# Patient Record
Sex: FEMALE | Race: WHITE | NOT HISPANIC OR LATINO | Employment: FULL TIME | URBAN - METROPOLITAN AREA
[De-identification: names, ages, dates, MRNs, and addresses within clinical notes are randomized per-mention and may not be internally consistent; named-entity substitution may affect disease eponyms.]

---

## 2017-09-12 ENCOUNTER — APPOINTMENT (OUTPATIENT)
Dept: WOMENS IMAGING | Facility: HOSPITAL | Age: 47
End: 2017-09-12

## 2017-09-12 PROCEDURE — 77067 SCR MAMMO BI INCL CAD: CPT | Performed by: RADIOLOGY

## 2017-09-12 PROCEDURE — 77063 BREAST TOMOSYNTHESIS BI: CPT | Performed by: RADIOLOGY

## 2019-03-27 ENCOUNTER — APPOINTMENT (OUTPATIENT)
Dept: WOMENS IMAGING | Facility: HOSPITAL | Age: 49
End: 2019-03-27

## 2019-03-27 PROCEDURE — 77063 BREAST TOMOSYNTHESIS BI: CPT | Performed by: RADIOLOGY

## 2019-03-27 PROCEDURE — 77067 SCR MAMMO BI INCL CAD: CPT | Performed by: RADIOLOGY

## 2020-08-21 ENCOUNTER — APPOINTMENT (OUTPATIENT)
Dept: WOMENS IMAGING | Facility: HOSPITAL | Age: 50
End: 2020-08-21

## 2020-08-21 PROCEDURE — 77067 SCR MAMMO BI INCL CAD: CPT | Performed by: RADIOLOGY

## 2020-08-21 PROCEDURE — 77063 BREAST TOMOSYNTHESIS BI: CPT | Performed by: RADIOLOGY

## 2020-08-31 ENCOUNTER — OFFICE VISIT (OUTPATIENT)
Dept: CARDIOLOGY | Facility: CLINIC | Age: 50
End: 2020-08-31

## 2020-08-31 VITALS
SYSTOLIC BLOOD PRESSURE: 148 MMHG | DIASTOLIC BLOOD PRESSURE: 88 MMHG | BODY MASS INDEX: 26.63 KG/M2 | HEIGHT: 64 IN | WEIGHT: 156 LBS | HEART RATE: 74 BPM

## 2020-08-31 DIAGNOSIS — I10 ESSENTIAL HYPERTENSION: ICD-10-CM

## 2020-08-31 DIAGNOSIS — Z82.49 FAMILY HISTORY OF HYPERTROPHIC CARDIOMYOPATHY: Primary | ICD-10-CM

## 2020-08-31 PROCEDURE — 99204 OFFICE O/P NEW MOD 45 MIN: CPT | Performed by: INTERNAL MEDICINE

## 2020-08-31 PROCEDURE — 93000 ELECTROCARDIOGRAM COMPLETE: CPT | Performed by: INTERNAL MEDICINE

## 2020-08-31 RX ORDER — MULTIVITAMIN
TABLET ORAL
COMMUNITY

## 2020-08-31 RX ORDER — HYDROCHLOROTHIAZIDE 25 MG/1
25 TABLET ORAL DAILY
COMMUNITY
Start: 2020-07-28

## 2020-08-31 RX ORDER — CARVEDILOL 3.12 MG/1
3.12 TABLET ORAL 2 TIMES DAILY WITH MEALS
Qty: 90 TABLET | Refills: 3 | Status: SHIPPED | OUTPATIENT
Start: 2020-08-31 | End: 2021-06-01

## 2020-08-31 NOTE — PROGRESS NOTES
[facility] New Patient Office Note     Encounter Date:20  Patient:Yulissa Landa  :1970  MRN:[id number]    Referring Provider:  Wendy Rodriguez MD    Consulted for: Evaluation for hypertrophic cardiomyopathy    Chief Complaint:   Chief Complaint   Patient presents with   • Abnormal echo     History of Presenting Illness:      Ms. Landa is a 49 y.o. woman with past medical history notable for hypertension and family history of hypertrophic cardiomyopathy who presents to our office for initial evaluation regarding her family history.  Patient's mother who is approximately 72 years old was recently diagnosed with hypertrophic cardiomyopathy.  Also sounds like her aunts and uncles also have fairly strong family history of this with 1 of them requiring a defibrillator.  Given her family history her primary care physician appropriately ordered an echocardiogram to help further evaluate for any significant pathology.  Her echocardiogram did demonstrate some mild concentric hypertrophy however no other significant valvular abnormalities or structural normalities with the heart.    Given her family history her primary care physician did want her to seek out further evaluation with a cardiologist given her left ventricular hypertrophy seen on echocardiogram.  In general she has been doing quite well.  She does have some mild dyspnea on exertion when walking up heavy inclines but otherwise has no significant limitations in her daily activities.  She denies any significant chest discomfort or significant dyspnea exertion.  She denies any palpitations or racing heart rate.  She denies any presyncopal or syncopal episodes.      Review of Systems:  Review of Systems   Constitution: Negative.   HENT: Negative.    Eyes: Negative.    Cardiovascular: Positive for dyspnea on exertion.   Respiratory: Negative.    Endocrine: Negative.    Hematologic/Lymphatic: Negative.    Skin: Negative.    Musculoskeletal:  "Negative.    Gastrointestinal: Negative.    Genitourinary: Negative.    Neurological: Negative.    Psychiatric/Behavioral: Negative.    Allergic/Immunologic: Negative.        Current Outpatient Medications on File Prior to Visit   Medication Sig Dispense Refill   • hydroCHLOROthiazide (HYDRODIURIL) 25 MG tablet Take 25 mg by mouth Daily.     • Multiple Vitamin (ONE-A-DAY ESSENTIAL) tablet Take  by mouth.     • vitamin E 100 UNIT capsule Take 100 Units by mouth Daily.       No current facility-administered medications on file prior to visit.        No Known Allergies    Past Medical History:   Diagnosis Date   • HTN, goal below 140/90        Past Surgical History:   Procedure Laterality Date   •  SECTION     • HYSTERECTOMY         Social History     Socioeconomic History   • Marital status: Unknown     Spouse name: Not on file   • Number of children: Not on file   • Years of education: Not on file   • Highest education level: Not on file   Tobacco Use   • Smoking status: Never Smoker   • Smokeless tobacco: Never Used   • Tobacco comment: caffeine use    Substance and Sexual Activity   • Alcohol use: Yes     Comment: rare   • Drug use: Never       Family History   Problem Relation Age of Onset   • Hypertrophic cardiomyopathy Other    • Heart disease Mother    • Diabetes Father    • Cancer Maternal Grandmother    • Sudden death Paternal Grandmother    • Cancer Paternal Grandfather        The following portions of the patient's history were reviewed and updated as appropriate: allergies, current medications, past family history, past medical history, past social history, past surgical history and problem list.       Objective:       Vitals:    20 1352   BP: 148/88   BP Location: Right arm   Patient Position: Sitting   Pulse: 74   Weight: 70.8 kg (156 lb)   Height: 162.6 cm (64\")       Physical Exam:  Constitutional: Well appearing, well developed, no acute distress   HENT: Oropharynx clear and membrane " moist  Eyes: Normal conjunctiva, no sclera icterus.  Neck: Supple, no carotid bruit bilaterally.  Cardiovascular: Regular rate and rhythm, No Murmur, No bilateral lower extremity edema.  Pulmonary: Normal respiratory effort, normal lung sounds, no wheezing.  Abdominal: Soft, nontender, no hepatosplenomegaly, liver is non-pulsatile.  Neurological: Alert and orient x 3.   Skin: Warm, dry, no ecchymosis, no rash.  Psych: Appropriate mood and affect. Normal judgment and insight.      Lab Results   Component Value Date    GLUCOSE 103 (H) 12/20/2014    BUN 19 08/04/2020    CREATININE 0.8 08/04/2020    BCR 24.0 (H) 08/04/2020    K 3.5 08/04/2020    CO2 30 08/04/2020    CALCIUM 9.7 08/04/2020    ALBUMIN 4.6 08/04/2020    LABIL2 1.6 08/04/2020    AST 22 08/04/2020    ALT 27 08/04/2020       Lab Results   Component Value Date    WBC 13.12 (H) 12/20/2014    HGB 12.1 12/20/2014    HCT 37.8 12/20/2014    MCV 89.6 12/20/2014     12/20/2014       No results found for: CKTOTAL, CKMB, CKMBINDEX, TROPONINI, TROPONINT    Lab Results   Component Value Date    CHLPL 265 (H) 08/04/2020    CHLPL 245 (H) 09/04/2019    CHLPL 241 (H) 06/06/2018     Lab Results   Component Value Date    TRIG 221 (H) 08/04/2020    TRIG 223 (H) 09/04/2019    TRIG 156 (H) 06/06/2018     Lab Results   Component Value Date    HDL 59 08/04/2020    HDL 50 09/04/2019    HDL 57 06/06/2018     Lab Results   Component Value Date     (H) 08/04/2020     (H) 09/04/2019     (H) 06/06/2018       No results found for: TSH      ECG 12 Lead  Date/Time: 8/31/2020 2:27 PM  Performed by: Osman Eden MD  Authorized by: Osman Eden MD   Comparison: compared with previous ECG from 8/25/2015  Similar to previous ECG  Rhythm: sinus rhythm    Clinical impression: normal ECG        Echocardiogram [facility] 8/19/2020:  · The ejection fraction biplane was calculated at 61%. Mild concentric left ventricular hypertrophy.  · Mild aortic  regurgitation is noted.  · Trace mitral regurgitation is present.  · Mild tricuspid regurgitation.  · Right ventricular systolic pressure of 16 mmHg.  · Trace pulmonic insufficiency present.          Assessment:          Diagnosis Plan   1. Family history of hypertrophic cardiomyopathy     2. Essential hypertension            Plan:       Ms. Landa is a 49 y.o. woman with past medical history notable for hypertension and family history of hypertrophic cardiomyopathy who presents to our office for initial evaluation regarding her family history.  In general her echocardiogram results seem relatively benign.  She does have some concentric hypertrophy but she has no high risk features such as severely thickened interventricular septum or focal wall motion abnormalities or abnormalities on her EKG.  Clinically she has no high risk features such as syncope or immediate family members with sudden cardiac death.  I would recommend starting a low-dose carvedilol to help optimize her blood pressure management and will continue to monitor her symptoms.  I will see her back in 6 months to reassess symptoms after starting this medication.    Family history of hypertrophic cardiomyopathy:  · Patient without any high risk features on echocardiogram  · No high risk clinical features  · No further testing needed at this time but could consider cardiac MRI or Holter monitor if any new symptoms develop  · We will start low-dose carvedilol for blood pressure management    Hypertension:  · Continue hydrochlorothiazide with the addition of carvedilol to help with optimizing blood pressure control    Follow-up:  6 months    Thank you for allowing me to participate in the care of Yulissa Landa. Feel free to contact me directly with any further questions or concerns.    Osman Eden MD  [facility]  08/31/20  14:22

## 2021-06-01 RX ORDER — CARVEDILOL 3.12 MG/1
TABLET ORAL
Qty: 60 TABLET | Refills: 2 | Status: SHIPPED | OUTPATIENT
Start: 2021-06-01 | End: 2021-06-21 | Stop reason: SDUPTHER

## 2021-06-21 ENCOUNTER — OFFICE VISIT (OUTPATIENT)
Dept: CARDIOLOGY | Facility: CLINIC | Age: 51
End: 2021-06-21

## 2021-06-21 VITALS
HEART RATE: 59 BPM | WEIGHT: 156 LBS | BODY MASS INDEX: 26.63 KG/M2 | DIASTOLIC BLOOD PRESSURE: 80 MMHG | SYSTOLIC BLOOD PRESSURE: 136 MMHG | HEIGHT: 64 IN

## 2021-06-21 DIAGNOSIS — Z82.49 FAMILY HISTORY OF HYPERTROPHIC CARDIOMYOPATHY: Primary | ICD-10-CM

## 2021-06-21 DIAGNOSIS — I10 ESSENTIAL HYPERTENSION: ICD-10-CM

## 2021-06-21 PROCEDURE — 93000 ELECTROCARDIOGRAM COMPLETE: CPT | Performed by: INTERNAL MEDICINE

## 2021-06-21 PROCEDURE — 99214 OFFICE O/P EST MOD 30 MIN: CPT | Performed by: INTERNAL MEDICINE

## 2021-06-21 RX ORDER — CARVEDILOL 3.12 MG/1
3.12 TABLET ORAL 2 TIMES DAILY WITH MEALS
Qty: 180 TABLET | Refills: 3 | Status: SHIPPED | OUTPATIENT
Start: 2021-06-21 | End: 2021-08-23 | Stop reason: SDUPTHER

## 2021-06-21 NOTE — PROGRESS NOTES
[facility] Follow Up Patient Office Note     Encounter Date:21  Patient:Yulissa Landa  :1970  MRN:[id number]      Chief Complaint:   Chief Complaint   Patient presents with   • Hypertension     History of Presenting Illness:      Ms. Landa is a 50 y.o. woman with past medical history notable for hypertension and family history of hypertrophic cardiomyopathy who presents to our office for scheduled follow-up.  At her last visit we did start carvedilol to help with her blood pressure management as well as her hypertrophy.  Fortunately this is significantly proved her symptoms and also improved her blood pressure control.      Review of Systems:  Review of Systems   Constitutional: Negative.   HENT: Negative.    Eyes: Negative.    Cardiovascular: Negative.    Respiratory: Negative.    Endocrine: Negative.    Hematologic/Lymphatic: Negative.    Skin: Negative.    Musculoskeletal: Negative.    Gastrointestinal: Negative.    Genitourinary: Negative.    Neurological: Negative.    Psychiatric/Behavioral: Negative.    Allergic/Immunologic: Negative.        Current Outpatient Medications on File Prior to Visit   Medication Sig Dispense Refill   • hydroCHLOROthiazide (HYDRODIURIL) 25 MG tablet Take 25 mg by mouth Daily.     • Multiple Vitamin (ONE-A-DAY ESSENTIAL) tablet Take  by mouth.     • vitamin E 100 UNIT capsule Take 100 Units by mouth Daily.     • [DISCONTINUED] carvedilol (COREG) 3.125 MG tablet TAKE ONE TABLET BY MOUTH TWICE A DAY WITH MEALS 60 tablet 2     No current facility-administered medications on file prior to visit.       No Known Allergies    Past Medical History:   Diagnosis Date   • HTN, goal below 140/90        Past Surgical History:   Procedure Laterality Date   •  SECTION     • HYSTERECTOMY         Social History     Socioeconomic History   • Marital status: Unknown     Spouse name: Not on file   • Number of children: Not on file   • Years of education: Not on file   •  "Highest education level: Not on file   Tobacco Use   • Smoking status: Never Smoker   • Smokeless tobacco: Never Used   • Tobacco comment: caffeine use    Substance and Sexual Activity   • Alcohol use: Yes     Comment: rare   • Drug use: Never       Family History   Problem Relation Age of Onset   • Hypertrophic cardiomyopathy Other    • Heart disease Mother    • Diabetes Father    • Cancer Maternal Grandmother    • Sudden death Paternal Grandmother    • Cancer Paternal Grandfather        The following portions of the patient's history were reviewed and updated as appropriate: allergies, current medications, past family history, past medical history, past social history, past surgical history and problem list.       Objective:       Vitals:    06/21/21 1114   BP: 136/80   BP Location: Left arm   Patient Position: Sitting   Pulse: 59   Weight: 70.8 kg (156 lb)   Height: 162.6 cm (64\")     Body mass index is 26.78 kg/m².     Physical Exam:  Constitutional: Well appearing, well developed, no acute distress   HENT: Oropharynx clear and membrane moist  Eyes: Normal conjunctiva, no sclera icterus.  Neck: Supple, no carotid bruit bilaterally.  Cardiovascular: Regular rate and rhythm, No Murmur, No bilateral lower extremity edema.  Pulmonary: Normal respiratory effort, normal lung sounds, no wheezing.  Abdominal: Soft, nontender, no hepatosplenomegaly, liver is non-pulsatile.  Neurological: Alert and orient x 3.   Skin: Warm, dry, no ecchymosis, no rash.  Psych: Appropriate mood and affect. Normal judgment and insight.      Lab Results   Component Value Date    GLUCOSE 103 (H) 12/20/2014    BUN 19 08/04/2020    CREATININE 0.8 08/04/2020    BCR 24.0 (H) 08/04/2020    K 3.5 08/04/2020    CO2 30 08/04/2020    CALCIUM 9.7 08/04/2020    ALBUMIN 4.6 08/04/2020    LABIL2 1.6 08/04/2020    AST 22 08/04/2020    ALT 27 08/04/2020       Lab Results   Component Value Date    WBC 13.12 (H) 12/20/2014    HGB 12.1 12/20/2014    HCT 37.8 " 12/20/2014    MCV 89.6 12/20/2014     12/20/2014     No results found for: CKTOTAL, CKMB, CKMBINDEX, TROPONINI, TROPONINT    Lab Results   Component Value Date    CHLPL 265 (H) 08/04/2020    CHLPL 245 (H) 09/04/2019    CHLPL 241 (H) 06/06/2018     Lab Results   Component Value Date    TRIG 221 (H) 08/04/2020    TRIG 223 (H) 09/04/2019    TRIG 156 (H) 06/06/2018     Lab Results   Component Value Date    HDL 59 08/04/2020    HDL 50 09/04/2019    HDL 57 06/06/2018     Lab Results   Component Value Date     (H) 08/04/2020     (H) 09/04/2019     (H) 06/06/2018     No results found for: TSH      ECG 12 Lead    Date/Time: 6/21/2021 12:02 PM  Performed by: Osman Eden MD  Authorized by: Osman Eden MD   Comparison: compared with previous ECG from 8/31/2020  Similar to previous ECG  Rhythm: sinus rhythm    Clinical impression: normal ECG        Echocardiogram [facility] 8/19/2020:  · The ejection fraction biplane was calculated at 61%. Mild concentric left ventricular hypertrophy.  · Mild aortic regurgitation is noted.  · Trace mitral regurgitation is present.  · Mild tricuspid regurgitation.  · Right ventricular systolic pressure of 16 mmHg.  · Trace pulmonic insufficiency present.          Assessment:          Diagnosis Plan   1. Family history of hypertrophic cardiomyopathy  ECG 12 Lead   2. Essential hypertension            Plan:       Ms. Landa is a 50 y.o. woman with past medical history notable for hypertension and family history of hypertrophic cardiomyopathy who presents to our office for initial evaluation regarding her family history.  In general patient is doing quite well.  I will continue with her beta-blocker therapy.  No changes are needed at this time.  We will see her back in 6 months.    Family history of hypertrophic cardiomyopathy:  · Patient without any high risk features on echocardiogram 8/2020  · No high risk clinical features  · No further testing  needed at this time but could consider cardiac MRI or Holter monitor if any new symptoms develop  · Continue low-dose carvedilol    Hypertension:  · Continue hydrochlorothiazide with the addition of carvedilol to help with optimizing blood pressure control    Follow-up:  6 months    Thank you for allowing me to participate in the care of Yulissa Landa. Feel free to contact me directly with any further questions or concerns.    Osman Eden MD  [facility]  06/21/21  12:05 EDT

## 2021-08-23 RX ORDER — CARVEDILOL 3.12 MG/1
3.12 TABLET ORAL 2 TIMES DAILY WITH MEALS
Qty: 180 TABLET | Refills: 3 | Status: SHIPPED | OUTPATIENT
Start: 2021-08-23 | End: 2022-08-18 | Stop reason: SDUPTHER

## 2021-09-03 ENCOUNTER — APPOINTMENT (OUTPATIENT)
Dept: WOMENS IMAGING | Facility: HOSPITAL | Age: 51
End: 2021-09-03

## 2021-09-03 PROCEDURE — 77067 SCR MAMMO BI INCL CAD: CPT | Performed by: RADIOLOGY

## 2021-09-03 PROCEDURE — 77063 BREAST TOMOSYNTHESIS BI: CPT | Performed by: RADIOLOGY

## 2021-09-07 ENCOUNTER — TELEPHONE (OUTPATIENT)
Dept: CARDIOLOGY | Facility: CLINIC | Age: 51
End: 2021-09-07

## 2021-09-07 DIAGNOSIS — E78.5 HYPERLIPIDEMIA LDL GOAL <100: Primary | ICD-10-CM

## 2021-09-07 RX ORDER — ATORVASTATIN CALCIUM 10 MG/1
10 TABLET, FILM COATED ORAL DAILY
Qty: 30 TABLET | Refills: 3 | Status: SHIPPED | OUTPATIENT
Start: 2021-09-07 | End: 2021-12-08

## 2021-09-07 NOTE — TELEPHONE ENCOUNTER
----- Message from Osman Eden MD sent at 9/7/2021 11:28 AM EDT -----  Yes would be reasonable to start statin if she is interested.  We can follow up on repeat lipid panel results at next office visit in 5 months.  Should have 6 mo follow up  ----- Message -----  From: Vicki Martinez APRN  Sent: 9/7/2021  10:45 AM EDT  To: Osman Eden MD    You saw this lady for the first time last year due to a family history of hypertrophic cardiomyopathy and dyspnea on exertion.  She was started on carvedilol with improvement in her symptoms.  She has hypertension and no history of coronary disease.    I received some labs from her PCP in my inbox.  Her LDL was 189, HDL 54, triglycerides 156.  She is not on a statin.  Her PCP recommended she see her cardiologist for further recommendations.    Since I have never seen her, I thought it would be best for you to make the recommendation.  I would be happy to call her with the recommendation.

## 2021-09-07 NOTE — TELEPHONE ENCOUNTER
I spoke with her and recommended starting atorvastatin.  She is agreeable.  We will repeat lipid a lipid panel and hepatic function panel in 3 months.

## 2021-11-18 ENCOUNTER — PREP FOR SURGERY (OUTPATIENT)
Dept: SURGERY | Facility: SURGERY CENTER | Age: 51
End: 2021-11-18

## 2021-11-18 DIAGNOSIS — Z12.11 COLON CANCER SCREENING: Primary | ICD-10-CM

## 2021-11-22 PROBLEM — Z12.11 COLON CANCER SCREENING: Status: ACTIVE | Noted: 2021-11-22

## 2021-12-08 RX ORDER — PRAVASTATIN SODIUM 10 MG
10 TABLET ORAL DAILY
Qty: 90 TABLET | Refills: 0 | Status: SHIPPED | OUTPATIENT
Start: 2021-12-08 | End: 2022-01-12

## 2022-01-04 ENCOUNTER — LAB (OUTPATIENT)
Dept: LAB | Facility: HOSPITAL | Age: 52
End: 2022-01-04

## 2022-01-04 DIAGNOSIS — E78.5 HYPERLIPIDEMIA LDL GOAL <100: ICD-10-CM

## 2022-01-04 LAB
ALBUMIN SERPL-MCNC: 4.7 G/DL (ref 3.5–5.2)
ALP SERPL-CCNC: 86 U/L (ref 39–117)
ALT SERPL W P-5'-P-CCNC: 63 U/L (ref 1–33)
AST SERPL-CCNC: 30 U/L (ref 1–32)
BILIRUB CONJ SERPL-MCNC: <0.2 MG/DL (ref 0–0.3)
BILIRUB INDIRECT SERPL-MCNC: ABNORMAL MG/DL
BILIRUB SERPL-MCNC: 0.4 MG/DL (ref 0–1.2)
CHOLEST SERPL-MCNC: 257 MG/DL (ref 0–200)
HDLC SERPL-MCNC: 56 MG/DL (ref 40–60)
LDLC SERPL CALC-MCNC: 164 MG/DL (ref 0–100)
LDLC/HDLC SERPL: 2.86 {RATIO}
PROT SERPL-MCNC: 7.5 G/DL (ref 6–8.5)
TRIGL SERPL-MCNC: 203 MG/DL (ref 0–150)
VLDLC SERPL-MCNC: 37 MG/DL (ref 5–40)

## 2022-01-04 PROCEDURE — 80076 HEPATIC FUNCTION PANEL: CPT

## 2022-01-04 PROCEDURE — 80061 LIPID PANEL: CPT

## 2022-01-04 PROCEDURE — 36415 COLL VENOUS BLD VENIPUNCTURE: CPT

## 2022-01-12 ENCOUNTER — OFFICE VISIT (OUTPATIENT)
Dept: CARDIOLOGY | Facility: CLINIC | Age: 52
End: 2022-01-12

## 2022-01-12 VITALS
BODY MASS INDEX: 27.39 KG/M2 | HEART RATE: 73 BPM | SYSTOLIC BLOOD PRESSURE: 130 MMHG | WEIGHT: 160.4 LBS | HEIGHT: 64 IN | DIASTOLIC BLOOD PRESSURE: 80 MMHG

## 2022-01-12 DIAGNOSIS — Z82.49 FAMILY HISTORY OF HYPERTROPHIC CARDIOMYOPATHY: ICD-10-CM

## 2022-01-12 DIAGNOSIS — I10 ESSENTIAL HYPERTENSION: ICD-10-CM

## 2022-01-12 DIAGNOSIS — E78.5 HYPERLIPIDEMIA LDL GOAL <100: Primary | ICD-10-CM

## 2022-01-12 PROCEDURE — 99214 OFFICE O/P EST MOD 30 MIN: CPT | Performed by: INTERNAL MEDICINE

## 2022-01-12 PROCEDURE — 93000 ELECTROCARDIOGRAM COMPLETE: CPT | Performed by: INTERNAL MEDICINE

## 2022-01-12 RX ORDER — ROSUVASTATIN CALCIUM 5 MG/1
5 TABLET, COATED ORAL 3 TIMES WEEKLY
Qty: 90 TABLET | Refills: 0 | Status: SHIPPED | OUTPATIENT
Start: 2022-01-12 | End: 2022-07-27

## 2022-01-12 NOTE — PROGRESS NOTES
[facility] Follow Up Patient Office Note     Encounter Date:22  Patient:Yulissa Landa  :1970  MRN:[id number]      Chief Complaint:   Chief Complaint   Patient presents with   • Cardiomyopathy     6 month f/u   • Hypertension     History of Presenting Illness:      Ms. Landa is a 51 y.o. woman with past medical history notable for hypertension and family history of hypertrophic cardiomyopathy who presents to our office for scheduled follow-up.  She was last seen approximately 6 months ago and at that time was doing great after recently starting on carvedilol to help out with her blood pressure and palpitations.  In general she continues to do quite well.  She was noted to have increasing cholesterol numbers and does have a strong family history of hypercholesterolemia and was initially started on atorvastatin daily but unfortunately had issues with myalgias after about a month.  She was supposed to transition over to pravastatin but for some reason the prescription did not get called into the pharmacy or she was not able to pick it up from her pharmacy.  In general she has not been on anything since then and overall is doing well clinically but is wondering what to do about her cholesterol.      Review of Systems:  Review of Systems   Constitutional: Negative.   HENT: Negative.    Eyes: Negative.    Cardiovascular: Negative.    Respiratory: Negative.    Endocrine: Negative.    Hematologic/Lymphatic: Negative.    Skin: Negative.    Musculoskeletal: Negative.    Gastrointestinal: Negative.    Genitourinary: Negative.    Neurological: Negative.    Psychiatric/Behavioral: Negative.    Allergic/Immunologic: Negative.        Current Outpatient Medications on File Prior to Visit   Medication Sig Dispense Refill   • carvedilol (COREG) 3.125 MG tablet Take 1 tablet by mouth 2 (Two) Times a Day With Meals. 180 tablet 3   • hydroCHLOROthiazide (HYDRODIURIL) 25 MG tablet Take 25 mg by mouth Daily.     •  "Multiple Vitamin (ONE-A-DAY ESSENTIAL) tablet Take  by mouth.     • vitamin E 100 UNIT capsule Take 100 Units by mouth Daily.     • [DISCONTINUED] pravastatin (PRAVACHOL) 10 MG tablet Take 1 tablet by mouth Daily. 90 tablet 0     No current facility-administered medications on file prior to visit.       Allergies   Allergen Reactions   • Pravastatin Myalgia       Past Medical History:   Diagnosis Date   • HTN, goal below 140/90        Past Surgical History:   Procedure Laterality Date   •  SECTION     • HYSTERECTOMY         Social History     Socioeconomic History   • Marital status: Unknown   Tobacco Use   • Smoking status: Never Smoker   • Smokeless tobacco: Never Used   • Tobacco comment: caffeine use    Substance and Sexual Activity   • Alcohol use: Yes     Comment: rare   • Drug use: Never       Family History   Problem Relation Age of Onset   • Hypertrophic cardiomyopathy Other    • Heart disease Mother    • Diabetes Father    • Cancer Maternal Grandmother    • Sudden death Paternal Grandmother    • Cancer Paternal Grandfather        The following portions of the patient's history were reviewed and updated as appropriate: allergies, current medications, past family history, past medical history, past social history, past surgical history and problem list.       Objective:       Vitals:    22 1505   BP: 130/80   BP Location: Left arm   Patient Position: Sitting   Pulse: 73   Weight: 72.8 kg (160 lb 6.4 oz)   Height: 162.6 cm (64\")     Body mass index is 27.53 kg/m².     Physical Exam:  Constitutional: Well appearing, well developed, no acute distress   HENT: Oropharynx clear and membrane moist  Eyes: Normal conjunctiva, no sclera icterus.  Neck: Supple, no carotid bruit bilaterally.  Cardiovascular: Regular rate and rhythm, No Murmur, No bilateral lower extremity edema.  Pulmonary: Normal respiratory effort, normal lung sounds, no wheezing.  Abdominal: Soft, nontender, no hepatosplenomegaly, liver " is non-pulsatile.  Neurological: Alert and orient x 3.   Skin: Warm, dry, no ecchymosis, no rash.  Psych: Appropriate mood and affect. Normal judgment and insight.      Lab Results   Component Value Date    GLUCOSE 103 (H) 12/20/2014    BUN 23 (H) 09/01/2021    CREATININE 0.9 09/01/2021    BCR 25.0 (H) 09/01/2021    K 4.5 09/01/2021    CO2 30 09/01/2021    CALCIUM 9.9 09/01/2021    ALBUMIN 4.70 01/04/2022    LABIL2 1.5 09/01/2021    AST 30 01/04/2022    ALT 63 (H) 01/04/2022       Lab Results   Component Value Date    WBC 13.12 (H) 12/20/2014    HGB 12.1 12/20/2014    HCT 37.8 12/20/2014    MCV 89.6 12/20/2014     12/20/2014     No results found for: CKTOTAL, CKMB, CKMBINDEX, TROPONINI, TROPONINT    Lab Results   Component Value Date    CHOL 257 (H) 01/04/2022    CHLPL 265 (H) 08/04/2020    CHLPL 245 (H) 09/04/2019    CHLPL 241 (H) 06/06/2018     Lab Results   Component Value Date    TRIG 203 (H) 01/04/2022    TRIG 221 (H) 08/04/2020    TRIG 223 (H) 09/04/2019     Lab Results   Component Value Date    HDL 56 01/04/2022    HDL 59 08/04/2020    HDL 50 09/04/2019     Lab Results   Component Value Date     (H) 01/04/2022     (H) 08/04/2020     (H) 09/04/2019     The 10-year ASCVD risk score (Demotte PETE Jr., et al., 2013) is: 2.5%    Values used to calculate the score:      Age: 51 years      Sex: Female      Is Non- : No      Diabetic: No      Tobacco smoker: No      Systolic Blood Pressure: 130 mmHg      Is BP treated: Yes      HDL Cholesterol: 56 mg/dL      Total Cholesterol: 257 mg/dL     No results found for: TSH      ECG 12 Lead    Date/Time: 1/12/2022 3:41 PM  Performed by: Osman Eden MD  Authorized by: Osman Eden MD   Comparison: compared with previous ECG from 6/21/2021  Similar to previous ECG  Rhythm: sinus rhythm        Echocardiogram [facility] 8/19/2020:  · The ejection fraction biplane was calculated at 61%. Mild concentric left  ventricular hypertrophy.  · Mild aortic regurgitation is noted.  · Trace mitral regurgitation is present.  · Mild tricuspid regurgitation.  · Right ventricular systolic pressure of 16 mmHg.  · Trace pulmonic insufficiency present.          Assessment:          Diagnosis Plan   1. Hyperlipidemia LDL goal <100  Lipid Panel    Hepatic Function Panel   2. Family history of hypertrophic cardiomyopathy  ECG 12 Lead   3. Essential hypertension            Plan:       Ms. Landa is a 51 y.o. woman with past medical history notable for hypertension and family history of hypertrophic cardiomyopathy who presents to our office for scheduled follow-up.  Overall she is doing well from a blood pressure and symptom standpoint.  With regards to her hypercholesterolemia her numbers are elevated but overall 10-year risk score is still low at 2.5%.  This does not necessitate the need for primary prevention statin therapy but given her family history and likely continued increase in her LDL she will need statin therapy at some point and would be reasonable to try an alternative statin dosing regimen at this time.  Had issues taking atorvastatin daily and she might be a slow metabolizer with this reason I am going to do alternative dosing with rosuvastatin 5 mg 3 times a week as this usually will allow for a nice washout.  And most people can tolerate this regimen.  We will follow-up on her cholesterol results and see if any further titration is needed and can always increase the strength of our rosuvastatin dosing but would keep it at 3 times a week dosing regimen..    Family history of hypertrophic cardiomyopathy:  · Patient without any high risk features on echocardiogram 8/2020  · No high risk clinical features  · No further testing needed at this time but could consider cardiac MRI or Holter monitor if any new symptoms develop  · Continue low-dose carvedilol    Mixed hyperlipidemia:  · Overall from a primary prevention standpoint low  risk but given strong family history would not be unreasonable to try different statin regimen  · We will start rosuvastatin 5 mg Monday/Wednesday/Friday  · We will follow-up with repeat lipid panel and CMP in about a month    Hypertension:  · Continue hydrochlorothiazide with the addition of carvedilol to help with optimizing blood pressure control    Follow-up:  6 months    Thank you for allowing me to participate in the care of Yulissa Landa. Feel free to contact me directly with any further questions or concerns.    Osman Eden MD  [facility]  01/12/22  15:41 EST

## 2022-02-25 ENCOUNTER — ANESTHESIA (OUTPATIENT)
Dept: SURGERY | Facility: SURGERY CENTER | Age: 52
End: 2022-02-25

## 2022-02-25 ENCOUNTER — ANESTHESIA EVENT (OUTPATIENT)
Dept: SURGERY | Facility: SURGERY CENTER | Age: 52
End: 2022-02-25

## 2022-02-25 ENCOUNTER — HOSPITAL ENCOUNTER (OUTPATIENT)
Facility: SURGERY CENTER | Age: 52
Setting detail: HOSPITAL OUTPATIENT SURGERY
Discharge: HOME | End: 2022-02-25
Attending: SURGERY | Admitting: SURGERY

## 2022-02-25 VITALS
DIASTOLIC BLOOD PRESSURE: 85 MMHG | TEMPERATURE: 97.5 F | RESPIRATION RATE: 16 BRPM | HEART RATE: 73 BPM | BODY MASS INDEX: 27.04 KG/M2 | WEIGHT: 158.4 LBS | OXYGEN SATURATION: 98 % | SYSTOLIC BLOOD PRESSURE: 151 MMHG | HEIGHT: 64 IN

## 2022-02-25 PROCEDURE — S0260 H&P FOR SURGERY: HCPCS | Performed by: SURGERY

## 2022-02-25 PROCEDURE — 25010000002 PROPOFOL 10 MG/ML EMULSION: Performed by: ANESTHESIOLOGY

## 2022-02-25 PROCEDURE — 45378 DIAGNOSTIC COLONOSCOPY: CPT | Performed by: SURGERY

## 2022-02-25 RX ORDER — LIDOCAINE HYDROCHLORIDE 10 MG/ML
0.5 INJECTION, SOLUTION INFILTRATION; PERINEURAL ONCE AS NEEDED
Status: DISCONTINUED | OUTPATIENT
Start: 2022-02-25 | End: 2022-02-25 | Stop reason: HOSPADM

## 2022-02-25 RX ORDER — ONDANSETRON 2 MG/ML
4 INJECTION INTRAMUSCULAR; INTRAVENOUS ONCE AS NEEDED
Status: DISCONTINUED | OUTPATIENT
Start: 2022-02-25 | End: 2022-02-25 | Stop reason: HOSPADM

## 2022-02-25 RX ORDER — PROPOFOL 10 MG/ML
VIAL (ML) INTRAVENOUS AS NEEDED
Status: DISCONTINUED | OUTPATIENT
Start: 2022-02-25 | End: 2022-02-25 | Stop reason: SURG

## 2022-02-25 RX ORDER — MAGNESIUM HYDROXIDE 1200 MG/15ML
LIQUID ORAL AS NEEDED
Status: DISCONTINUED | OUTPATIENT
Start: 2022-02-25 | End: 2022-02-25 | Stop reason: HOSPADM

## 2022-02-25 RX ORDER — LABETALOL HYDROCHLORIDE 5 MG/ML
5 INJECTION, SOLUTION INTRAVENOUS
Status: DISCONTINUED | OUTPATIENT
Start: 2022-02-25 | End: 2022-02-25 | Stop reason: HOSPADM

## 2022-02-25 RX ORDER — LIDOCAINE HYDROCHLORIDE 20 MG/ML
INJECTION, SOLUTION INFILTRATION; PERINEURAL AS NEEDED
Status: DISCONTINUED | OUTPATIENT
Start: 2022-02-25 | End: 2022-02-25 | Stop reason: SURG

## 2022-02-25 RX ORDER — SODIUM CHLORIDE 0.9 % (FLUSH) 0.9 %
10 SYRINGE (ML) INJECTION AS NEEDED
Status: DISCONTINUED | OUTPATIENT
Start: 2022-02-25 | End: 2022-02-25 | Stop reason: HOSPADM

## 2022-02-25 RX ORDER — FENTANYL CITRATE 50 UG/ML
25 INJECTION, SOLUTION INTRAMUSCULAR; INTRAVENOUS
Status: DISCONTINUED | OUTPATIENT
Start: 2022-02-25 | End: 2022-02-25 | Stop reason: HOSPADM

## 2022-02-25 RX ORDER — SODIUM CHLORIDE, SODIUM LACTATE, POTASSIUM CHLORIDE, CALCIUM CHLORIDE 600; 310; 30; 20 MG/100ML; MG/100ML; MG/100ML; MG/100ML
1000 INJECTION, SOLUTION INTRAVENOUS CONTINUOUS
Status: DISCONTINUED | OUTPATIENT
Start: 2022-02-25 | End: 2022-02-25 | Stop reason: HOSPADM

## 2022-02-25 RX ORDER — HYDRALAZINE HYDROCHLORIDE 20 MG/ML
10 INJECTION INTRAMUSCULAR; INTRAVENOUS
Status: DISCONTINUED | OUTPATIENT
Start: 2022-02-25 | End: 2022-02-25 | Stop reason: HOSPADM

## 2022-02-25 RX ADMIN — SODIUM CHLORIDE, POTASSIUM CHLORIDE, SODIUM LACTATE AND CALCIUM CHLORIDE 1000 ML: 600; 310; 30; 20 INJECTION, SOLUTION INTRAVENOUS at 08:18

## 2022-02-25 RX ADMIN — PROPOFOL 120 MG: 10 INJECTION, EMULSION INTRAVENOUS at 09:25

## 2022-02-25 RX ADMIN — PROPOFOL 160 MCG/KG/MIN: 10 INJECTION, EMULSION INTRAVENOUS at 09:25

## 2022-02-25 RX ADMIN — LIDOCAINE HYDROCHLORIDE 60 MG: 20 INJECTION, SOLUTION INFILTRATION; PERINEURAL at 09:25

## 2022-02-25 NOTE — ANESTHESIA POSTPROCEDURE EVALUATION
"Patient: Yulissa Landa    Procedure Summary     Date: 02/25/22 Room / Location: [facility]    Anesthesia Start: 0922 Anesthesia Stop: 0951    Procedure: COLONOSCOPY (N/A ) Diagnosis:       Colon cancer screening      (Colon cancer screening [Z12.11])    Surgeons: Eva Altman MD Provider: Yeyo Chung MD    Anesthesia Type: MAC ASA Status: 2          Anesthesia Type: MAC    Vitals  Vitals Value Taken Time   /73 02/25/22 0952   Temp 36.4 °C (97.5 °F) 02/25/22 0952   Pulse 74 02/25/22 0954   Resp 16 02/25/22 0952   SpO2 100 % 02/25/22 0954   Vitals shown include unvalidated device data.        Post Anesthesia Care and Evaluation    Patient location during evaluation: bedside  Patient participation: complete - patient participated  Level of consciousness: sleepy but conscious  Pain management: adequate  Airway patency: patent  Anesthetic complications: No anesthetic complications    Cardiovascular status: acceptable  Respiratory status: acceptable  Hydration status: acceptable    Comments: */73 (BP Location: Left arm, Patient Position: Lying)   Pulse 80   Temp 36.4 °C (97.5 °F) (Infrared)   Resp 16   Ht 162.6 cm (64\")   Wt 71.8 kg (158 lb 6.4 oz)   SpO2 99%   BMI 27.19 kg/m²         "

## 2022-02-25 NOTE — ANESTHESIA PREPROCEDURE EVALUATION
Anesthesia Evaluation     no history of anesthetic complications:  NPO Solid Status: > 8 hours  NPO Liquid Status: > 2 hours           Airway   Mallampati: III  TM distance: <3 FB  Neck ROM: full  Possible difficult intubation  Dental - normal exam     Pulmonary - negative pulmonary ROS and normal exam   (-) COPD, asthma, sleep apnea, not a smoker    PE comment: nonlabored  Cardiovascular - normal exam    Rhythm: regular  Rate: normal    (+) hypertension, hyperlipidemia,   (-) valvular problems/murmurs, past MI, CAD, dysrhythmias, angina    ROS comment: On Beta-blocker for family hx of HOCM in mother--EKG's normal thus far per pt    Neuro/Psych- negative ROS  (-) seizures, TIA, CVA  GI/Hepatic/Renal/Endo - negative ROS   (-) GERD, liver disease, no renal disease, diabetes, no thyroid disorder    Musculoskeletal (-) negative ROS    Abdominal    Substance History      OB/GYN          Other                      Anesthesia Plan    ASA 2     MAC       Anesthetic plan, all risks, benefits, and alternatives have been provided, discussed and informed consent has been obtained with: patient.        CODE STATUS:

## 2022-03-03 ENCOUNTER — TELEPHONE (OUTPATIENT)
Dept: SURGERY | Facility: CLINIC | Age: 52
End: 2022-03-03

## 2022-03-03 NOTE — TELEPHONE ENCOUNTER
Called pt and let her know that there were no findings in her colonoscopy and our office will send the recall in 5 years. Pt understood   .

## 2022-03-09 ENCOUNTER — TELEPHONE (OUTPATIENT)
Dept: CARDIOLOGY | Facility: CLINIC | Age: 52
End: 2022-03-09

## 2022-07-27 RX ORDER — ROSUVASTATIN CALCIUM 5 MG/1
TABLET, COATED ORAL
Qty: 36 TABLET | Refills: 2 | Status: SHIPPED | OUTPATIENT
Start: 2022-07-27 | End: 2023-02-20 | Stop reason: SDUPTHER

## 2022-08-10 ENCOUNTER — LAB (OUTPATIENT)
Dept: LAB | Facility: HOSPITAL | Age: 52
End: 2022-08-10

## 2022-08-10 DIAGNOSIS — E78.5 HYPERLIPIDEMIA LDL GOAL <100: ICD-10-CM

## 2022-08-10 LAB
ALBUMIN SERPL-MCNC: 4.5 G/DL (ref 3.5–5.2)
ALP SERPL-CCNC: 80 U/L (ref 39–117)
ALT SERPL W P-5'-P-CCNC: 43 U/L (ref 1–33)
AST SERPL-CCNC: 29 U/L (ref 1–32)
BILIRUB CONJ SERPL-MCNC: <0.2 MG/DL (ref 0–0.3)
BILIRUB INDIRECT SERPL-MCNC: ABNORMAL MG/DL
BILIRUB SERPL-MCNC: 0.5 MG/DL (ref 0–1.2)
PROT SERPL-MCNC: 7.2 G/DL (ref 6–8.5)

## 2022-08-10 PROCEDURE — 80076 HEPATIC FUNCTION PANEL: CPT

## 2022-08-10 PROCEDURE — 36415 COLL VENOUS BLD VENIPUNCTURE: CPT

## 2022-08-18 ENCOUNTER — OFFICE VISIT (OUTPATIENT)
Dept: CARDIOLOGY | Facility: CLINIC | Age: 52
End: 2022-08-18

## 2022-08-18 VITALS
DIASTOLIC BLOOD PRESSURE: 82 MMHG | WEIGHT: 169 LBS | HEART RATE: 79 BPM | BODY MASS INDEX: 28.85 KG/M2 | HEIGHT: 64 IN | SYSTOLIC BLOOD PRESSURE: 140 MMHG

## 2022-08-18 DIAGNOSIS — Z82.49 FAMILY HISTORY OF HYPERTROPHIC CARDIOMYOPATHY: Primary | ICD-10-CM

## 2022-08-18 DIAGNOSIS — I10 ESSENTIAL HYPERTENSION: ICD-10-CM

## 2022-08-18 DIAGNOSIS — E78.5 HYPERLIPIDEMIA LDL GOAL <100: ICD-10-CM

## 2022-08-18 PROCEDURE — 93000 ELECTROCARDIOGRAM COMPLETE: CPT | Performed by: INTERNAL MEDICINE

## 2022-08-18 PROCEDURE — 99214 OFFICE O/P EST MOD 30 MIN: CPT | Performed by: INTERNAL MEDICINE

## 2022-08-18 RX ORDER — CARVEDILOL 6.25 MG/1
6.25 TABLET ORAL 2 TIMES DAILY WITH MEALS
Qty: 180 TABLET | Refills: 3 | Status: SHIPPED | OUTPATIENT
Start: 2022-08-18 | End: 2023-02-20 | Stop reason: ALTCHOICE

## 2022-08-18 NOTE — PROGRESS NOTES
[facility] Follow Up Patient Office Note     Encounter Date:22  Patient:Yulissa Landa  :1970  MRN:[id number]      Chief Complaint:   Chief Complaint   Patient presents with   • Hyperlipidemia     6 month f/u   • Hypertension     History of Presenting Illness:      Ms. Landa is a 51 y.o. woman with past medical history notable for hypertension and family history of hypertrophic cardiomyopathy who presents to our office for scheduled follow-up.  overall she is doing well.  Blood pressure still mildly but overall is feeling much better.  Her cholesterol unfortunately was not checked.  They did check her until had a panel implies that did not put all the lipid panel as well but if she is getting follow-up labs with her primary care physician soon.      Review of Systems:  Review of Systems   Constitutional: Negative.   HENT: Negative.    Eyes: Negative.    Cardiovascular: Negative.    Respiratory: Negative.    Endocrine: Negative.    Hematologic/Lymphatic: Negative.    Skin: Negative.    Musculoskeletal: Negative.    Gastrointestinal: Negative.    Genitourinary: Negative.    Neurological: Negative.    Psychiatric/Behavioral: Negative.    Allergic/Immunologic: Negative.        Current Outpatient Medications on File Prior to Visit   Medication Sig Dispense Refill   • hydroCHLOROthiazide (HYDRODIURIL) 25 MG tablet Take 25 mg by mouth Daily.     • Multiple Vitamin (ONE-A-DAY ESSENTIAL) tablet Take  by mouth.     • rosuvastatin (CRESTOR) 5 MG tablet TAKE ONE TABLET BY MOUTH THREE TIMES A WEEK 36 tablet 2   • vitamin E 100 UNIT capsule Take 100 Units by mouth Daily.     • [DISCONTINUED] carvedilol (COREG) 3.125 MG tablet Take 1 tablet by mouth 2 (Two) Times a Day With Meals. 180 tablet 3     No current facility-administered medications on file prior to visit.       Allergies   Allergen Reactions   • Pravastatin Myalgia       Past Medical History:   Diagnosis Date   • HTN, goal below 140/90    •  "Hyperlipidemia        Past Surgical History:   Procedure Laterality Date   •  SECTION     • COLONOSCOPY N/A 2022    Procedure: COLONOSCOPY;  Surgeon: Eva Altman MD;  [place] [facility] [place]  Service: Gastroenterology;  Laterality: N/A;  normal   • HYSTERECTOMY     • LAPAROSCOPIC SALPINGOOPHERECTOMY Bilateral        Social History     Socioeconomic History   • Marital status: Unknown   Tobacco Use   • Smoking status: Never Smoker   • Smokeless tobacco: Never Used   • Tobacco comment: caffeine use    Vaping Use   • Vaping Use: Never used   Substance and Sexual Activity   • Alcohol use: Yes     Comment: rare   • Drug use: Never   • Sexual activity: Yes       Family History   Problem Relation Age of Onset   • Hypertrophic cardiomyopathy Other    • Heart disease Mother    • Diabetes Father    • Cancer Maternal Grandmother    • Sudden death Paternal Grandmother    • Cancer Paternal Grandfather    • Malig Hyperthermia Neg Hx        The following portions of the patient's history were reviewed and updated as appropriate: allergies, current medications, past family history, past medical history, past social history, past surgical history and problem list.       Objective:       Vitals:    22 1356   BP: 140/82   BP Location: Left arm   Patient Position: Sitting   Pulse: 79   Weight: 76.7 kg (169 lb)   Height: 162.6 cm (64\")     Body mass index is 29.01 kg/m².     Physical Exam:  Constitutional: Well appearing, well developed, no acute distress   HENT: Oropharynx clear and membrane moist  Eyes: Normal conjunctiva, no sclera icterus.  Neck: Supple, no carotid bruit bilaterally.  Cardiovascular: Regular rate and rhythm, No Murmur, No bilateral lower extremity edema.  Pulmonary: Normal respiratory effort, normal lung sounds, no wheezing.  Abdominal: Soft, nontender, no hepatosplenomegaly, liver is non-pulsatile.  Neurological: Alert and orient x 3.   Skin: Warm, dry, no ecchymosis, no rash.  Psych: " Appropriate mood and affect. Normal judgment and insight.      Lab Results   Component Value Date    GLUCOSE 103 (H) 12/20/2014    BUN 23 (H) 09/01/2021    CREATININE 0.9 09/01/2021    BCR 25.0 (H) 09/01/2021    K 4.5 09/01/2021    CO2 30 09/01/2021    CALCIUM 9.9 09/01/2021    ALBUMIN 4.50 08/10/2022    LABIL2 1.5 09/01/2021    AST 29 08/10/2022    ALT 43 (H) 08/10/2022       Lab Results   Component Value Date    WBC 13.12 (H) 12/20/2014    HGB 12.1 12/20/2014    HCT 37.8 12/20/2014    MCV 89.6 12/20/2014     12/20/2014     No results found for: CKTOTAL, CKMB, CKMBINDEX, TROPONINI, TROPONINT    Lab Results   Component Value Date    CHOL 257 (H) 01/04/2022    CHLPL 265 (H) 08/04/2020    CHLPL 245 (H) 09/04/2019    CHLPL 241 (H) 06/06/2018     Lab Results   Component Value Date    TRIG 203 (H) 01/04/2022    TRIG 221 (H) 08/04/2020    TRIG 223 (H) 09/04/2019     Lab Results   Component Value Date    HDL 56 01/04/2022    HDL 59 08/04/2020    HDL 50 09/04/2019     Lab Results   Component Value Date     (H) 01/04/2022     (H) 08/04/2020     (H) 09/04/2019     The 10-year ASCVD risk score (Weiner DC Jr., et al., 2013) is: 3%    Values used to calculate the score:      Age: 51 years      Sex: Female      Is Non- : No      Diabetic: No      Tobacco smoker: No      Systolic Blood Pressure: 140 mmHg      Is BP treated: Yes      HDL Cholesterol: 56 mg/dL      Total Cholesterol: 257 mg/dL     No results found for: TSH      ECG 12 Lead    Date/Time: 8/18/2022 2:22 PM  Performed by: Osman Eden MD  Authorized by: Osman Eden MD   Comparison: compared with previous ECG from 1/12/2022  Similar to previous ECG  Rhythm: sinus rhythm        Echocardiogram [facility] 8/19/2020:  · The ejection fraction biplane was calculated at 61%. Mild concentric left ventricular hypertrophy.  · Mild aortic regurgitation is noted.  · Trace mitral regurgitation is present.  · Mild  tricuspid regurgitation.  · Right ventricular systolic pressure of 16 mmHg.  · Trace pulmonic insufficiency present.          Assessment:          Diagnosis Plan   1. Family history of hypertrophic cardiomyopathy  ECG 12 Lead   2. Essential hypertension     3. Hyperlipidemia LDL goal <100            Plan:       Ms. Landa is a 51 y.o. woman with past medical history notable for hypertension and family history of hypertrophic cardiomyopathy who presents to our office for scheduled follow-up.  Overall she is doing well but I would like to get better blood pressure control and will increase her carvedilol.  She is getting follow-up labs with her primary care physician we will follow-up on her cholesterol levels.  Her ALT is slightly elevated but actually better than before and would continue to monitor for the time being..    Family history of hypertrophic cardiomyopathy:  · Patient without any high risk features on echocardiogram 8/2020  · No high risk clinical features  · No further testing needed at this time but could consider cardiac MRI or Holter monitor if any new symptoms develop  · Continue carvedilol    Mixed hyperlipidemia:  · Overall from a primary prevention standpoint low risk but given strong family history would not be unreasonable to try different statin regimen  · We will continue rosuvastatin 5 mg Monday/Wednesday/Friday  · We will follow-up with repeat lipid panel     Hypertension:  · Continue we will uptitrate carvedilol and monitor response    Follow-up:  6 months    Thank you for allowing me to participate in the care of Yulissa Landa. Feel free to contact me directly with any further questions or concerns.    Osman Eden MD  [facility]  08/18/22  15:10 EDT

## 2022-10-17 ENCOUNTER — APPOINTMENT (OUTPATIENT)
Dept: WOMENS IMAGING | Facility: HOSPITAL | Age: 52
End: 2022-10-17

## 2022-10-17 PROCEDURE — 77067 SCR MAMMO BI INCL CAD: CPT | Performed by: RADIOLOGY

## 2022-10-17 PROCEDURE — 77063 BREAST TOMOSYNTHESIS BI: CPT | Performed by: RADIOLOGY

## 2022-12-19 RX ORDER — CARVEDILOL 3.12 MG/1
TABLET ORAL
Qty: 180 TABLET | Refills: 3 | Status: SHIPPED | OUTPATIENT
Start: 2022-12-19 | End: 2023-02-20

## 2023-02-20 ENCOUNTER — OFFICE VISIT (OUTPATIENT)
Dept: CARDIOLOGY | Facility: CLINIC | Age: 53
End: 2023-02-20
Payer: COMMERCIAL

## 2023-02-20 VITALS
BODY MASS INDEX: 28.03 KG/M2 | HEART RATE: 69 BPM | DIASTOLIC BLOOD PRESSURE: 90 MMHG | WEIGHT: 164.2 LBS | HEIGHT: 64 IN | SYSTOLIC BLOOD PRESSURE: 148 MMHG

## 2023-02-20 DIAGNOSIS — Z82.49 FAMILY HISTORY OF HYPERTROPHIC CARDIOMYOPATHY: ICD-10-CM

## 2023-02-20 DIAGNOSIS — I10 ESSENTIAL HYPERTENSION: Primary | ICD-10-CM

## 2023-02-20 PROCEDURE — 93000 ELECTROCARDIOGRAM COMPLETE: CPT | Performed by: INTERNAL MEDICINE

## 2023-02-20 PROCEDURE — 99214 OFFICE O/P EST MOD 30 MIN: CPT | Performed by: INTERNAL MEDICINE

## 2023-02-20 RX ORDER — CARVEDILOL 6.25 MG/1
6.25 TABLET ORAL 2 TIMES DAILY WITH MEALS
Qty: 180 TABLET | Refills: 3 | Status: SHIPPED | OUTPATIENT
Start: 2023-02-20

## 2023-02-20 RX ORDER — ROSUVASTATIN CALCIUM 5 MG/1
5 TABLET, COATED ORAL 3 TIMES WEEKLY
Qty: 36 TABLET | Refills: 2 | Status: SHIPPED | OUTPATIENT
Start: 2023-02-20

## 2023-02-20 RX ORDER — LEVOTHYROXINE SODIUM 0.03 MG/1
25 TABLET ORAL DAILY
COMMUNITY
Start: 2023-02-17 | End: 2023-08-16

## 2023-02-20 NOTE — PROGRESS NOTES
[facility] Follow Up Patient Office Note     Encounter Date:23  Patient:Yulissa Landa  :1970  MRN:[id number]      Chief Complaint:   Chief Complaint   Patient presents with   • Hypertension     6 month f/u   • Hyperlipidemia     History of Presenting Illness:      Ms. Landa is a 52 y.o. woman with past medical history notable for hypertension and family history of hypertrophic cardiomyopathy who presents to our office for scheduled follow-up.  Overall patient is doing well since her last appointment.  She has been out of her blood pressure medicines for the last couple of days is getting her hydrochlorothiazide filled today from her primary.  Outside of that she is doing well.  She was found to have a low thyroid by her primary and is also starting on Synthroid.      Review of Systems:  Review of Systems   Constitutional: Negative.   HENT: Negative.    Eyes: Negative.    Cardiovascular: Negative.    Respiratory: Negative.    Endocrine: Negative.    Hematologic/Lymphatic: Negative.    Skin: Negative.    Musculoskeletal: Negative.    Gastrointestinal: Negative.    Genitourinary: Negative.    Neurological: Negative.    Psychiatric/Behavioral: Negative.    Allergic/Immunologic: Negative.        Current Outpatient Medications on File Prior to Visit   Medication Sig Dispense Refill   • hydroCHLOROthiazide (HYDRODIURIL) 25 MG tablet Take 25 mg by mouth Daily.     • levothyroxine (SYNTHROID, LEVOTHROID) 25 MCG tablet Take 25 mcg by mouth Daily.     • Multiple Vitamin (ONE-A-DAY ESSENTIAL) tablet Take  by mouth.     • vitamin E 100 UNIT capsule Take 100 Units by mouth Daily.     • [DISCONTINUED] carvedilol (COREG) 3.125 MG tablet TAKE ONE TABLET BY MOUTH TWICE A DAY WITH A MEAL 180 tablet 3   • [DISCONTINUED] rosuvastatin (CRESTOR) 5 MG tablet TAKE ONE TABLET BY MOUTH THREE TIMES A WEEK 36 tablet 2   • [DISCONTINUED] carvedilol (COREG) 6.25 MG tablet Take 1 tablet by mouth 2 (Two) Times a Day With  "Meals. 180 tablet 3     No current facility-administered medications on file prior to visit.       Allergies   Allergen Reactions   • Pravastatin Myalgia       Past Medical History:   Diagnosis Date   • HTN, goal below 140/90    • Hyperlipidemia        Past Surgical History:   Procedure Laterality Date   •  SECTION     • COLONOSCOPY N/A 2022    Procedure: COLONOSCOPY;  Surgeon: Eva Altman MD;  [facility];  Service: Gastroenterology;  Laterality: N/A;  normal   • HYSTERECTOMY     • LAPAROSCOPIC SALPINGOOPHERECTOMY Bilateral        Social History     Socioeconomic History   • Marital status: Unknown   Tobacco Use   • Smoking status: Never   • Smokeless tobacco: Never   • Tobacco comments:     caffeine use    Vaping Use   • Vaping Use: Never used   Substance and Sexual Activity   • Alcohol use: Yes     Comment: rare   • Drug use: Never   • Sexual activity: Yes       Family History   Problem Relation Age of Onset   • Hypertrophic cardiomyopathy Other    • Heart disease Mother    • Diabetes Father    • Cancer Maternal Grandmother    • Sudden death Paternal Grandmother    • Cancer Paternal Grandfather    • Malig Hyperthermia Neg Hx        The following portions of the patient's history were reviewed and updated as appropriate: allergies, current medications, past family history, past medical history, past social history, past surgical history and problem list.       Objective:       Vitals:    23 1227   BP: 148/90   BP Location: Left arm   Patient Position: Sitting   Pulse: 69   Weight: 74.5 kg (164 lb 3.2 oz)   Height: 162.6 cm (64\")     Body mass index is 28.18 kg/m².     Physical Exam:  Constitutional: Well appearing, well developed, no acute distress   HENT: Oropharynx clear and membrane moist  Eyes: Normal conjunctiva, no sclera icterus.  Neck: Supple, no carotid bruit bilaterally.  Cardiovascular: Regular rate and rhythm, No Murmur, No bilateral lower extremity " edema.  Pulmonary: Normal respiratory effort, normal lung sounds, no wheezing.  Abdominal: Soft, nontender, no hepatosplenomegaly, liver is non-pulsatile.  Neurological: Alert and orient x 3.   Skin: Warm, dry, no ecchymosis, no rash.  Psych: Appropriate mood and affect. Normal judgment and insight.      Lab Results   Component Value Date    GLUCOSE 103 (H) 12/20/2014    BUN 23 (H) 09/01/2021    CREATININE 0.9 09/01/2021    BCR 25.0 (H) 09/01/2021    K 4.5 09/01/2021    CO2 30 09/01/2021    CALCIUM 9.9 09/01/2021    ALBUMIN 4.50 08/10/2022    LABIL2 1.5 09/01/2021    AST 29 08/10/2022    ALT 43 (H) 08/10/2022       Lab Results   Component Value Date    WBC 13.12 (H) 12/20/2014    HGB 12.1 12/20/2014    HCT 37.8 12/20/2014    MCV 89.6 12/20/2014     12/20/2014     No results found for: CKTOTAL, CKMB, CKMBINDEX, TROPONINI, TROPONINT    Lab Results   Component Value Date    CHOL 257 (H) 01/04/2022    CHLPL 265 (H) 08/04/2020    CHLPL 245 (H) 09/04/2019    CHLPL 241 (H) 06/06/2018     Lab Results   Component Value Date    TRIG 203 (H) 01/04/2022    TRIG 221 (H) 08/04/2020    TRIG 223 (H) 09/04/2019     Lab Results   Component Value Date    HDL 56 01/04/2022    HDL 59 08/04/2020    HDL 50 09/04/2019     Lab Results   Component Value Date     (H) 01/04/2022     (H) 08/04/2020     (H) 09/04/2019     The 10-year ASCVD risk score (Greta DK, et al., 2019) is: 3.6%    Values used to calculate the score:      Age: 52 years      Sex: Female      Is Non- : No      Diabetic: No      Tobacco smoker: No      Systolic Blood Pressure: 148 mmHg      Is BP treated: Yes      HDL Cholesterol: 56 mg/dL      Total Cholesterol: 257 mg/dL     No results found for: TSH      ECG 12 Lead    Date/Time: 2/20/2023 1:04 PM  Performed by: Osman Eden MD  Authorized by: Osman Eden MD   Comparison: compared with previous ECG from 8/18/2022  Similar to previous ECG  Rhythm: sinus  rhythm        Echocardiogram [facility] 8/19/2020:  · The ejection fraction biplane was calculated at 61%. Mild concentric left ventricular hypertrophy.  · Mild aortic regurgitation is noted.  · Trace mitral regurgitation is present.  · Mild tricuspid regurgitation.  · Right ventricular systolic pressure of 16 mmHg.  · Trace pulmonic insufficiency present.          Assessment:          Diagnosis Plan   1. Essential hypertension  ECG 12 Lead      2. Family history of hypertrophic cardiomyopathy  ECG 12 Lead             Plan:       Ms. Landa is a 52 y.o. woman with past medical history notable for hypertension and family history of hypertrophic cardiomyopathy who presents to our office for scheduled follow-up.  Overall patient doing well.  We will continue the current medical regimen she did not get the new prescription for carvedilol and I have called that in again.  Would like to see how she does on higher dose carvedilol with her hydrochlorothiazide.  Her blood pressures likely somewhat elevated due to being out of hydrochlorothiazide today as well as her thyroid being a little abnormal hopefully with correcting both of these blood pressure will be better.    Family history of hypertrophic cardiomyopathy:  · Patient without any high risk features on echocardiogram 8/2020  · No high risk clinical features  · No further testing needed at this time but could consider cardiac MRI or Holter monitor if any new symptoms develop  · Continue carvedilol    Mixed hyperlipidemia:  · Repeat lipid panel last week shows improvement and cholesterol panel even with a elevated TSH level.  · We will continue rosuvastatin 5 mg Monday/Wednesday/Friday  · Normal ALT and AST 2/2023    Hypertension:  · Will increase carvedilol and monitor response      Follow-up:  6 months      Thank you for allowing me to participate in the care of Yulissa Landa. Feel free to contact me directly with any further questions or concerns.    Osman VILLANUEVA  MD [facility]  [facility]  02/20/23  13:05 EST

## 2023-08-28 ENCOUNTER — OFFICE VISIT (OUTPATIENT)
Dept: CARDIOLOGY | Facility: CLINIC | Age: 53
End: 2023-08-28
Payer: COMMERCIAL

## 2023-08-28 VITALS
SYSTOLIC BLOOD PRESSURE: 144 MMHG | HEIGHT: 64 IN | HEART RATE: 93 BPM | WEIGHT: 167.4 LBS | BODY MASS INDEX: 28.58 KG/M2 | DIASTOLIC BLOOD PRESSURE: 98 MMHG | OXYGEN SATURATION: 95 %

## 2023-08-28 DIAGNOSIS — I10 ESSENTIAL HYPERTENSION: Primary | ICD-10-CM

## 2023-08-28 DIAGNOSIS — E78.5 HYPERLIPIDEMIA LDL GOAL <100: ICD-10-CM

## 2023-08-28 DIAGNOSIS — Z82.49 FAMILY HISTORY OF HYPERTROPHIC CARDIOMYOPATHY: ICD-10-CM

## 2023-08-28 PROCEDURE — 93000 ELECTROCARDIOGRAM COMPLETE: CPT | Performed by: NURSE PRACTITIONER

## 2023-08-28 PROCEDURE — 99214 OFFICE O/P EST MOD 30 MIN: CPT | Performed by: NURSE PRACTITIONER

## 2023-08-28 RX ORDER — CARVEDILOL 6.25 MG/1
6.25 TABLET ORAL 2 TIMES DAILY WITH MEALS
Qty: 180 TABLET | Refills: 3 | Status: SHIPPED | OUTPATIENT
Start: 2023-08-28

## 2023-08-28 RX ORDER — HYDROCHLOROTHIAZIDE 25 MG/1
25 TABLET ORAL DAILY
Qty: 90 TABLET | Refills: 3 | Status: SHIPPED | OUTPATIENT
Start: 2023-08-28

## 2023-08-28 NOTE — PROGRESS NOTES
[facility] Follow Up Office Note     Encounter Date:23  Patient:Yulissa Landa  :1970  MRN:[id number]      Chief Complaint:   Chief Complaint   Patient presents with    Hypertension         History of Presenting Illness:        Yulissa Landa is a 52 y.o. female who is here for follow-up.  She is a patient of Dr Eden.    Patient has past medical history significant for essential hypertension, hyperlipidemia, and family history of hypertrophic cardiomyopathy.    Patient was last seen by Dr. Eden in February of this year.  Her blood pressure was a little high at this time but she had run out of her medications, additionally she was about to start on medication for hypothyroidism.    Today patient states overall she has been doing well.  She has noted some shortness of breath with more exertional activities such as hiking with her family and walking up stairs coming into the office today.  She does not have any exertional dyspnea walking flat distances.  She denies chest pain, palpitations, lower extremity edema, PND orthopnea.  She admits she has not been as active recently but would like to increase her activity level however her PCP asked as to clear her first.  Additionally, she has been out of hydrochlorothiazide for about a week and is waiting on refills from her PCP.    Review of Systems:  Review of Systems   Cardiovascular:  Positive for dyspnea on exertion. Negative for chest pain, leg swelling, orthopnea and palpitations.   Respiratory:  Negative for shortness of breath.      Current Outpatient Medications on File Prior to Visit   Medication Sig Dispense Refill    ELDERBERRY PO Take  by mouth.      Multiple Vitamin (ONE-A-DAY ESSENTIAL) tablet Take  by mouth.      rosuvastatin (CRESTOR) 5 MG tablet Take 1 tablet by mouth 3 (Three) Times a Week. 36 tablet 2    vitamin E 100 UNIT capsule Take 1 capsule by mouth Daily.      [DISCONTINUED] carvedilol (COREG) 6.25 MG tablet Take 1  "tablet by mouth 2 (Two) Times a Day With Meals. 180 tablet 3    [DISCONTINUED] hydroCHLOROthiazide (HYDRODIURIL) 25 MG tablet Take 1 tablet by mouth Daily.      levothyroxine (SYNTHROID, LEVOTHROID) 25 MCG tablet Take 25 mcg by mouth Daily.       No current facility-administered medications on file prior to visit.       Allergies   Allergen Reactions    Pravastatin Myalgia       Past Medical History:   Diagnosis Date    HTN, goal below 140/90     Hyperlipidemia        Past Surgical History:   Procedure Laterality Date     SECTION      COLONOSCOPY N/A 2022    Procedure: COLONOSCOPY;  Surgeon: Eva Altman MD;  [place] [facility] [place] [facility];  Service: Gastroenterology;  Laterality: N/A;  normal    HYSTERECTOMY      LAPAROSCOPIC SALPINGOOPHERECTOMY Bilateral        Social History     Socioeconomic History    Marital status: Unknown   Tobacco Use    Smoking status: Never    Smokeless tobacco: Never    Tobacco comments:     caffeine use    Vaping Use    Vaping Use: Never used   Substance and Sexual Activity    Alcohol use: Yes     Comment: rare    Drug use: Never    Sexual activity: Yes       Family History   Problem Relation Age of Onset    Hypertrophic cardiomyopathy Other     Heart disease Mother     Diabetes Father     Cancer Maternal Grandmother     Sudden death Paternal Grandmother     Cancer Paternal Grandfather     Malig Hyperthermia Neg Hx        The following portions of the patient's history were reviewed and updated as appropriate: allergies, current medications, past family history, past medical history, past social history, past surgical history and problem list.       Objective:       Vitals:    23 0929   BP: 144/98   BP Location: Left arm   Patient Position: Sitting   Cuff Size: Adult   Pulse: 93   SpO2: 95%   Weight: 75.9 kg (167 lb 6.4 oz)   Height: 162.6 cm (64\")         Physical Exam:  Constitutional: Well appearing, well developed, no acute distress   HENT: Oropharynx clear and " membrane moist  Eyes: Normal conjunctiva, no sclera icterus  Neck: Supple, no carotid bruit bilaterally  Cardiovascular: Regular rate and rhythm, No Murmur, No bilateral lower extremity edema  Pulmonary: Normal respiratory effort, normal lung sounds, no wheezing  Neurological: Alert and orient x 3  Skin: Warm, dry, no ecchymosis, no rash  Psych: Appropriate mood and affect. Normal judgment and insight         Lab Results   Component Value Date     09/01/2021     08/04/2020    K 4.5 09/01/2021    K 3.5 08/04/2020     09/01/2021    CL 98 08/04/2020    CO2 30 09/01/2021    CO2 30 08/04/2020    BUN 23 (H) 09/01/2021    BUN 19 08/04/2020    CREATININE 0.9 09/01/2021    CREATININE 0.8 08/04/2020    GLUCOSE 103 (H) 12/20/2014    CALCIUM 9.9 09/01/2021    CALCIUM 9.7 08/04/2020    ALBUMIN 4.50 08/10/2022    ALBUMIN 4.70 01/04/2022    BILITOT 0.5 08/10/2022    BILITOT 0.4 01/04/2022    AST 29 08/10/2022    AST 30 01/04/2022    ALT 43 (H) 08/10/2022    ALT 63 (H) 01/04/2022     Lab Results   Component Value Date    WBC 4.57 08/03/2023    WBC 4.87 08/24/2022    HGB 14.0 08/03/2023    HGB 13.6 08/24/2022    HCT 43.1 08/03/2023    HCT 43.1 08/24/2022    MCV 88.5 08/03/2023    MCV 90.5 08/24/2022     08/03/2023     08/24/2022     Lab Results   Component Value Date    CHOL 257 (H) 01/04/2022    TRIG 203 (H) 01/04/2022    TRIG 221 (H) 08/04/2020    HDL 56 01/04/2022    HDL 59 08/04/2020     (H) 01/04/2022     (H) 08/04/2020     No results found for: PROBNP, BNP  No results found for: CKTOTAL, CKMB, CKMBINDEX, TROPONINI, TROPONINT  No results found for: TSH        ECG 12 Lead    Date/Time: 8/28/2023 9:39 AM  Performed by: Mimi Stoll APRN  Authorized by: Mimi Stoll APRN   Comparison: compared with previous ECG from 2/20/2023  Similar to previous ECG  Rhythm: sinus rhythm  Rate: normal  BPM: 84  Conduction: conduction normal  T Waves: T waves normal           Assessment:           Diagnosis Plan   1. Essential hypertension  ECG 12 Lead    Adult Transthoracic Echo Complete w/ Color, Spectral and Contrast if Necessary Per Protocol      2. Family history of hypertrophic cardiomyopathy        3. Hyperlipidemia LDL goal <100               Plan:       Family history of hypertrophic cardiomyopathy - she reports some exertional dyspnea, may be related to deconditioning however with family history and increased amplitude on EKG will repeat echocardiogram.  Echocardiogram 8/2020 without any high risk features.  Continue carvedilol.  If develops increased symptoms would consider cardiac MRI or Holter  Mixed hyperlipidemia -on rosuvastatin 5 mg Monday Wednesday Friday due to history of intolerance to statins due to myalgias.  Her most recent labs show an increase in LDL to 124 from 91.  Her TSH was normal at this time.  We discussed working on diet and exercise for the next 6 months, if remains elevated would increase rosuvastatin to 10 mg 3 times weekly  Essential hypertension -she is on carvedilol and hydrochlorothiazide.  She ran out of hydrochlorothiazide and is waiting on refill from PCP.  I have prescribed a year supply of this for her.  I reviewed her most recent labs with normal kidney function and electrolytes.  Additionally, she is taking 6.25 mg carvedilol in the morning followed by 3.12 5 in the evening.  She will increase this to 6.25 mg twice daily and give me a blood pressure report in about a month    Patient is seen today for follow-up.  I think she is doing well overall, however with her symptoms of increased dyspnea will repeat echocardiogram.  I did cleared her to increase her activity level and would like to see her work on her diet and increased exercise to lower her cholesterol.  We will touch base in a couple of weeks about her blood pressure.  She will have repeat lipid panel in about 6 months.  Follow-up with Dr. Eden in 6 months or earlier with  problems.      Orders Placed This Encounter   Procedures    ECG 12 Lead     This order was created via procedure documentation     Order Specific Question:   Release to patient     Answer:   Routine Release [[id number]]    Adult Transthoracic Echo Complete w/ Color, Spectral and Contrast if Necessary Per Protocol     Standing Status:   Future     Standing Expiration Date:   8/28/2024     Order Specific Question:   Reason for exam?     Answer:   Dyspnea     Order Specific Question:   Reason for exam?     Answer:   Heart Failure, Cardiomyopathy, or Sytemic or Pulmonary Hypertension     Order Specific Question:   Release to patient     Answer:   Routine Release [[id number]]            CHASITY Cisneros  [facility]  08/28/23  10:04 EDT

## 2023-10-02 ENCOUNTER — TELEPHONE (OUTPATIENT)
Dept: CARDIOLOGY | Facility: CLINIC | Age: 53
End: 2023-10-02

## 2023-10-02 NOTE — TELEPHONE ENCOUNTER
Caller: SHANNAN    Relationship to patient: SELF    Best call back number: [phone]        Type of visit: ECHO    Requested date: 10/20/23-----SOMETIME BETWEEN 12:00 AND 1:00----SHE HAS AN APPOINTMENT OVER THERE AT 2:00 ALREADY.

## 2023-10-20 ENCOUNTER — HOSPITAL ENCOUNTER (OUTPATIENT)
Dept: CARDIOLOGY | Facility: HOSPITAL | Age: 53
Discharge: HOME | End: 2023-10-20
Payer: COMMERCIAL

## 2023-10-20 ENCOUNTER — TELEPHONE (OUTPATIENT)
Dept: CARDIOLOGY | Facility: CLINIC | Age: 53
End: 2023-10-20
Payer: COMMERCIAL

## 2023-10-20 VITALS
BODY MASS INDEX: 28.51 KG/M2 | SYSTOLIC BLOOD PRESSURE: 130 MMHG | WEIGHT: 167 LBS | DIASTOLIC BLOOD PRESSURE: 82 MMHG | HEIGHT: 64 IN | HEART RATE: 84 BPM

## 2023-10-20 DIAGNOSIS — I10 ESSENTIAL HYPERTENSION: ICD-10-CM

## 2023-10-20 LAB
AORTIC ARCH: 3.1 CM
AORTIC DIMENSIONLESS INDEX: 0.9 (DI)
ASCENDING AORTA: 2.9 CM
BH CV ECHO LEFT VENTRICLE GLOBAL LONGITUDINAL STRAIN: -23.1 %
BH CV ECHO MEAS - ACS: 1.75 CM
BH CV ECHO MEAS - AO MAX PG: 9.4 MMHG
BH CV ECHO MEAS - AO MEAN PG: 5 MMHG
BH CV ECHO MEAS - AO ROOT DIAM: 2.43 CM
BH CV ECHO MEAS - AO V2 MAX: 153 CM/SEC
BH CV ECHO MEAS - AO V2 VTI: 30.2 CM
BH CV ECHO MEAS - AVA(I,D): 3.1 CM2
BH CV ECHO MEAS - EDV(CUBED): 54.9 ML
BH CV ECHO MEAS - EDV(MOD-SP2): 54 ML
BH CV ECHO MEAS - EDV(MOD-SP4): 59 ML
BH CV ECHO MEAS - EF(MOD-BP): 64.6 %
BH CV ECHO MEAS - EF(MOD-SP2): 64.8 %
BH CV ECHO MEAS - EF(MOD-SP4): 66.1 %
BH CV ECHO MEAS - ESV(CUBED): 9.5 ML
BH CV ECHO MEAS - ESV(MOD-SP2): 19 ML
BH CV ECHO MEAS - ESV(MOD-SP4): 20 ML
BH CV ECHO MEAS - FS: 44.2 %
BH CV ECHO MEAS - IVS/LVPW: 1.11 CM
BH CV ECHO MEAS - IVSD: 1 CM
BH CV ECHO MEAS - LAT PEAK E' VEL: 11.4 CM/SEC
BH CV ECHO MEAS - LV DIASTOLIC VOL/BSA (35-75): 32.6 CM2
BH CV ECHO MEAS - LV MASS(C)D: 109 GRAMS
BH CV ECHO MEAS - LV MAX PG: 8.1 MMHG
BH CV ECHO MEAS - LV MEAN PG: 4 MMHG
BH CV ECHO MEAS - LV SYSTOLIC VOL/BSA (12-30): 11 CM2
BH CV ECHO MEAS - LV V1 MAX: 142 CM/SEC
BH CV ECHO MEAS - LV V1 VTI: 28.5 CM
BH CV ECHO MEAS - LVIDD: 3.8 CM
BH CV ECHO MEAS - LVIDS: 2.12 CM
BH CV ECHO MEAS - LVOT AREA: 3.3 CM2
BH CV ECHO MEAS - LVOT DIAM: 2.05 CM
BH CV ECHO MEAS - LVPWD: 0.9 CM
BH CV ECHO MEAS - MED PEAK E' VEL: 9.7 CM/SEC
BH CV ECHO MEAS - MV A DUR: 0.17 SEC
BH CV ECHO MEAS - MV A MAX VEL: 85.4 CM/SEC
BH CV ECHO MEAS - MV DEC SLOPE: 967.5 CM/SEC2
BH CV ECHO MEAS - MV DEC TIME: 0.14 SEC
BH CV ECHO MEAS - MV E MAX VEL: 118 CM/SEC
BH CV ECHO MEAS - MV E/A: 1.38
BH CV ECHO MEAS - MV MAX PG: 8 MMHG
BH CV ECHO MEAS - MV MEAN PG: 3.1 MMHG
BH CV ECHO MEAS - MV P1/2T: 44 MSEC
BH CV ECHO MEAS - MV V2 VTI: 30.8 CM
BH CV ECHO MEAS - MVA(P1/2T): 5 CM2
BH CV ECHO MEAS - MVA(VTI): 3 CM2
BH CV ECHO MEAS - PA ACC TIME: 0.15 SEC
BH CV ECHO MEAS - PA V2 MAX: 93.7 CM/SEC
BH CV ECHO MEAS - PULM A REVS DUR: 0.15 SEC
BH CV ECHO MEAS - PULM A REVS VEL: 31 CM/SEC
BH CV ECHO MEAS - PULM DIAS VEL: 51.9 CM/SEC
BH CV ECHO MEAS - PULM S/D: 1.15
BH CV ECHO MEAS - PULM SYS VEL: 59.7 CM/SEC
BH CV ECHO MEAS - QP/QS: 0.5
BH CV ECHO MEAS - RAP SYSTOLE: 3 MMHG
BH CV ECHO MEAS - RV MAX PG: 2.49 MMHG
BH CV ECHO MEAS - RV V1 MAX: 78.8 CM/SEC
BH CV ECHO MEAS - RV V1 VTI: 16.5 CM
BH CV ECHO MEAS - RVOT DIAM: 1.9 CM
BH CV ECHO MEAS - RVSP: 22.8 MMHG
BH CV ECHO MEAS - SI(MOD-SP2): 19.3 ML/M2
BH CV ECHO MEAS - SI(MOD-SP4): 21.5 ML/M2
BH CV ECHO MEAS - SUP REN AO DIAM: 2.1 CM
BH CV ECHO MEAS - SV(LVOT): 93.8 ML
BH CV ECHO MEAS - SV(MOD-SP2): 35 ML
BH CV ECHO MEAS - SV(MOD-SP4): 39 ML
BH CV ECHO MEAS - SV(RVOT): 47 ML
BH CV ECHO MEAS - TAPSE (>1.6): 2.34 CM
BH CV ECHO MEAS - TR MAX PG: 19.8 MMHG
BH CV ECHO MEAS - TR MAX VEL: 222.6 CM/SEC
BH CV ECHO MEASUREMENTS AVERAGE E/E' RATIO: 11.18
BH CV XLRA - RV BASE: 3 CM
BH CV XLRA - RV LENGTH: 6.4 CM
BH CV XLRA - RV MID: 2.6 CM
BH CV XLRA - TDI S': 17.5 CM/SEC
LEFT ATRIUM VOLUME INDEX: 15 ML/M2
SINUS: 2.9 CM
STJ: 2.44 CM

## 2023-10-20 PROCEDURE — 93306 TTE W/DOPPLER COMPLETE: CPT

## 2023-10-20 PROCEDURE — 93356 MYOCRD STRAIN IMG SPCKL TRCK: CPT

## 2023-10-20 NOTE — TELEPHONE ENCOUNTER
----- Message from CHASITY Murdock sent at 10/20/2023  2:34 PM EDT -----  Please let her know echo looks good.  Normal LVEF, no significant valvular heart disease    We ordered this because she was more short of breath.  How she feeling?

## 2023-10-20 NOTE — TELEPHONE ENCOUNTER
Called and left VM. Will continue to try to reach patient.     Wendy Thornton RN  Triage [facility] [place]

## 2023-10-20 NOTE — TELEPHONE ENCOUNTER
"I spoke with Yulissa Landa and updated pt on results from provider.  Pt verbalized understanding and has no further questions at this time.    Pt shares that she's feeling a \"little bit better\".  It had been recommended that she try to exercise more, and she's been doing this and it's going okay.    Thank you,    Elva BARAKAT RN  Triage [facility] [place]  10/20/23 14:43 EDT    "

## 2023-10-20 NOTE — PROGRESS NOTES
Please let her know echo looks good.  Normal LVEF, no significant valvular heart disease    We ordered this because she was more short of breath.  How she feeling?

## 2023-12-20 RX ORDER — ROSUVASTATIN CALCIUM 5 MG/1
TABLET, COATED ORAL
Qty: 36 TABLET | Refills: 3 | Status: SHIPPED | OUTPATIENT
Start: 2023-12-20

## 2023-12-20 NOTE — TELEPHONE ENCOUNTER
Per last office visit with Mimi 2023    Mixed hyperlipidemia -on rosuvastatin 5 mg Monday Wednesday Friday due to history of intolerance to statins due to myalgias.  Her most recent labs show an increase in LDL to 124 from 91.

## 2024-02-29 ENCOUNTER — OFFICE VISIT (OUTPATIENT)
Age: 54
End: 2024-02-29
Payer: COMMERCIAL

## 2024-02-29 VITALS
SYSTOLIC BLOOD PRESSURE: 128 MMHG | HEIGHT: 64 IN | HEART RATE: 67 BPM | BODY MASS INDEX: 29.02 KG/M2 | DIASTOLIC BLOOD PRESSURE: 82 MMHG | WEIGHT: 170 LBS

## 2024-02-29 DIAGNOSIS — E78.5 HYPERLIPIDEMIA LDL GOAL <100: Primary | ICD-10-CM

## 2024-02-29 DIAGNOSIS — Z82.49 FAMILY HISTORY OF HYPERTROPHIC CARDIOMYOPATHY: ICD-10-CM

## 2024-02-29 DIAGNOSIS — I10 ESSENTIAL HYPERTENSION: ICD-10-CM

## 2024-02-29 PROCEDURE — 93000 ELECTROCARDIOGRAM COMPLETE: CPT | Performed by: INTERNAL MEDICINE

## 2024-02-29 PROCEDURE — 99214 OFFICE O/P EST MOD 30 MIN: CPT | Performed by: INTERNAL MEDICINE

## 2024-02-29 RX ORDER — ESTRADIOL 0.05 MG/D
1 PATCH, EXTENDED RELEASE TRANSDERMAL 2 TIMES WEEKLY
COMMUNITY
Start: 2023-12-27

## 2024-02-29 RX ORDER — ROSUVASTATIN CALCIUM 10 MG/1
10 TABLET, COATED ORAL 3 TIMES WEEKLY
Qty: 30 TABLET | Refills: 3 | Status: SHIPPED | OUTPATIENT
Start: 2024-03-01

## 2024-02-29 NOTE — PROGRESS NOTES
[facility] Follow Up Patient Office Note     Encounter Date:24  Patient:Yulissa Landa  :1970  MRN:[id number]      Chief Complaint:   Chief Complaint   Patient presents with    Hypertension     6 month f/u     History of Presenting Illness:      Ms. Landa is a 53 y.o. woman with past medical history notable for hypertension and family history of hypertrophic cardiomyopathy who presents to our office for scheduled follow-up.  From a cardiac standpoint she is doing great has no complaints.  Her thyroid is actually gone back to normal now longer on any supplementation.  Unfortunately cholesterol numbers have worsened over the last 6 months they have crept up a little bit despite being better controlled about a year ago.        Review of Systems:  Review of Systems   Constitutional: Negative.   HENT: Negative.     Eyes: Negative.    Cardiovascular: Negative.    Respiratory: Negative.     Endocrine: Negative.    Hematologic/Lymphatic: Negative.    Skin: Negative.    Musculoskeletal: Negative.    Gastrointestinal: Negative.    Genitourinary: Negative.    Neurological: Negative.    Psychiatric/Behavioral: Negative.     Allergic/Immunologic: Negative.        Current Outpatient Medications on File Prior to Visit   Medication Sig Dispense Refill    carvedilol (COREG) 6.25 MG tablet Take 1 tablet by mouth 2 (Two) Times a Day With Meals. 180 tablet 3    ELDERBERRY PO Take  by mouth.      estradiol (MINIVELLE, VIVELLE-DOT) 0.05 MG/24HR patch Place 1 patch on the skin as directed by provider 2 (Two) Times a Week.      hydroCHLOROthiazide (HYDRODIURIL) 25 MG tablet Take 1 tablet by mouth Daily. 90 tablet 3    Multiple Vitamin (ONE-A-DAY ESSENTIAL) tablet Take  by mouth.      vitamin E 100 UNIT capsule Take 1 capsule by mouth Daily.      [DISCONTINUED] rosuvastatin (CRESTOR) 5 MG tablet TAKE 1 TABLET BY MOUTH 3 TIMES A WEEK 36 tablet 3    [DISCONTINUED] levothyroxine (SYNTHROID, LEVOTHROID) 25 MCG tablet  "Take 1 tablet by mouth Daily.       No current facility-administered medications on file prior to visit.       Allergies   Allergen Reactions    Pravastatin Myalgia       Past Medical History:   Diagnosis Date    HTN, goal below 140/90     Hyperlipidemia        Past Surgical History:   Procedure Laterality Date     SECTION      COLONOSCOPY N/A 2022    Procedure: COLONOSCOPY;  Surgeon: Eva Altman MD;  Location: [facility] OR;  Service: Gastroenterology;  Laterality: N/A;  normal    HYSTERECTOMY      LAPAROSCOPIC SALPINGOOPHERECTOMY Bilateral        Social History     Socioeconomic History    Marital status: Unknown   Tobacco Use    Smoking status: Never    Smokeless tobacco: Never    Tobacco comments:     caffeine use    Vaping Use    Vaping Use: Never used   Substance and Sexual Activity    Alcohol use: Yes     Comment: rare    Drug use: Never    Sexual activity: Yes       Family History   Problem Relation Age of Onset    Hypertrophic cardiomyopathy Other     Heart disease Mother     Diabetes Father     Cancer Maternal Grandmother     Sudden death Paternal Grandmother     Cancer Paternal Grandfather     Malig Hyperthermia Neg Hx        The following portions of the patient's history were reviewed and updated as appropriate: allergies, current medications, past family history, past medical history, past social history, past surgical history and problem list.       Objective:       Vitals:    24 1229   BP: 128/82   BP Location: Left arm   Patient Position: Sitting   Pulse: 67   Weight: 77.1 kg (170 lb)   Height: 162.6 cm (64\")     Body mass index is 29.18 kg/m².     Physical Exam:  Constitutional: Well appearing, Well-developed, No acute distress   HENT: Oropharynx clear and membrane moist  Eyes: Normal conjunctiva, no sclera icterus.  Neck: Supple, no carotid bruit bilaterally.  Cardiovascular: Regular rate and rhythm, No Murmur, No bilateral lower extremity edema.  Pulmonary: Normal " "respiratory effort, normal lung sounds, no wheezing.  Neurological: Alert and orient x 3.   Skin: Warm, dry, no ecchymosis, no rash.  Psych: Appropriate mood and affect. Normal judgment and insight.       Lab Results   Component Value Date    GLUCOSE 103 (H) 12/20/2014    BUN 17 02/17/2023    CREATININE 0.88 02/17/2023    EGFRIFAFRI >60 02/17/2023    BCR 19.3 02/17/2023    K 4.1 02/28/2024    CO2 31 (H) 02/17/2023    CALCIUM 9.4 02/17/2023    ALBUMIN 4.5 02/17/2023    LABIL2 1.6 02/17/2023    AST 29 02/17/2023    ALT 25 02/17/2023       Lab Results   Component Value Date    WBC 5.10 02/27/2024    HGB 14.1 02/27/2024    HCT 43.8 02/27/2024    MCV 89.2 02/27/2024     02/27/2024     No results found for: \"CKTOTAL\", \"CKMB\", \"CKMBINDEX\", \"TROPONINI\", \"TROPONINT\"    Lab Results   Component Value Date    CHOL 257 (H) 01/04/2022    CHLPL 265 (H) 08/04/2020    CHLPL 245 (H) 09/04/2019    CHLPL 241 (H) 06/06/2018     Lab Results   Component Value Date    TRIG 203 (H) 01/04/2022    TRIG 221 (H) 08/04/2020    TRIG 223 (H) 09/04/2019     Lab Results   Component Value Date    HDL 56 01/04/2022    HDL 59 08/04/2020    HDL 50 09/04/2019     Lab Results   Component Value Date     (H) 01/04/2022     (H) 08/04/2020     (H) 09/04/2019     The 10-year ASCVD risk score (Greta GUILLEN, et al., 2019) is: 2.9%    Values used to calculate the score:      Age: 53 years      Sex: Female      Is Non- : No      Diabetic: No      Tobacco smoker: No      Systolic Blood Pressure: 128 mmHg      Is BP treated: Yes      HDL Cholesterol: 56 mg/dL      Total Cholesterol: 257 mg/dL     No results found for: \"TSH\"      ECG 12 Lead    Date/Time: 2/29/2024 4:03 PM  Performed by: Osman Eden MD    Authorized by: Osman Eden MD  Comparison: compared with previous ECG from 8/28/2023  Similar to previous ECG  Rhythm: sinus rhythm        Echocardiogram 10/20/2023 images reviewed by myself:  Left " ventricular systolic function is normal. Left ventricular ejection fraction appears to be 61 - 65%  Left ventricular diastolic function was normal.  Estimated right ventricular systolic pressure from tricuspid regurgitation is normal (<35 mmHg). Calculated right ventricular systolic pressure from tricuspid regurgitation is 23 mmHg.    Echocardiogram [facility] 8/19/2020:  The ejection fraction biplane was calculated at 61%. Mild concentric left ventricular hypertrophy.  Mild aortic regurgitation is noted.  Trace mitral regurgitation is present.  Mild tricuspid regurgitation.  Right ventricular systolic pressure of 16 mmHg.  Trace pulmonic insufficiency present.          Assessment:          Diagnosis Plan   1. Hyperlipidemia LDL goal <100        2. Family history of hypertrophic cardiomyopathy        3. Essential hypertension                 Plan:       Ms. Landa is a 53 y.o. woman with past medical history notable for hypertension and family history of hypertrophic cardiomyopathy who presents to our office for scheduled follow-up.  Symptomatically patient is doing well blood pressure is well-controlled like to get a little bit better cholesterol control to see if she can tolerate taking the rosuvastatin at 10 mg dosing 3 times a week rather than 5.    Family history of hypertrophic cardiomyopathy:  Patient without any high risk features on echocardiogram 8/2020  No high risk clinical features  No further testing needed at this time but could consider cardiac MRI or Holter monitor if any new symptoms develop  Continue carvedilol    Mixed hyperlipidemia:  Repeat lipid panel this week shows stable and cholesterol panel with LDL of 154 was better at 124 8/2023.  We will increase rosuvastatin from 5 mg to 10 mg Monday/Wednesday/Friday  Normal ALT and AST 2/2024    Hypertension:  Blood pressure well-controlled no changes needed at this time  CMP with normal sodium, Cr, and potassium 2/2024      Follow-up:  6  months      Thank you for allowing me to participate in the care of Yulissa Landa. Feel free to contact me directly with any further questions or concerns.    Osman Eden MD  [facility]  02/29/24  16:03 EST

## 2024-08-23 ENCOUNTER — TELEPHONE (OUTPATIENT)
Dept: CARDIOLOGY | Facility: CLINIC | Age: 54
End: 2024-08-23
Payer: COMMERCIAL

## 2024-08-23 DIAGNOSIS — E78.5 HYPERLIPIDEMIA LDL GOAL <100: Primary | ICD-10-CM

## 2024-08-23 NOTE — TELEPHONE ENCOUNTER
Caller: Yulissa Landa    Relationship to patient: Self    Best call back number:  [phone]    Patient is needing: PATIENT CALLED TO SEE IF SHE NEEDS LABS BEFORE VISIT. CURRENTLY NO ORDERS

## 2024-08-26 NOTE — TELEPHONE ENCOUNTER
If you could let her know I put in for a CMP and lipid panel will be good to see how she is doing on her new medical regimen.

## 2024-08-26 NOTE — TELEPHONE ENCOUNTER
Patient had labs drawn back in February 2024. She has an apt with Vicki 08/29/24. Does she need labs prior to?

## 2024-08-27 ENCOUNTER — LAB (OUTPATIENT)
Dept: LAB | Facility: HOSPITAL | Age: 54
End: 2024-08-27
Payer: COMMERCIAL

## 2024-08-27 DIAGNOSIS — E78.5 HYPERLIPIDEMIA LDL GOAL <100: ICD-10-CM

## 2024-08-27 LAB
ALBUMIN SERPL-MCNC: 4.7 G/DL (ref 3.5–5.2)
ALBUMIN/GLOB SERPL: 1.6 G/DL
ALP SERPL-CCNC: 81 U/L (ref 39–117)
ALT SERPL W P-5'-P-CCNC: 37 U/L (ref 1–33)
ANION GAP SERPL CALCULATED.3IONS-SCNC: 9.5 MMOL/L (ref 5–15)
AST SERPL-CCNC: 26 U/L (ref 1–32)
BILIRUB SERPL-MCNC: 0.6 MG/DL (ref 0–1.2)
BUN SERPL-MCNC: 15 MG/DL (ref 6–20)
BUN/CREAT SERPL: 17 (ref 7–25)
CALCIUM SPEC-SCNC: 10 MG/DL (ref 8.6–10.5)
CHLORIDE SERPL-SCNC: 96 MMOL/L (ref 98–107)
CHOLEST SERPL-MCNC: 215 MG/DL (ref 0–200)
CO2 SERPL-SCNC: 32.5 MMOL/L (ref 22–29)
CREAT SERPL-MCNC: 0.88 MG/DL (ref 0.57–1)
EGFRCR SERPLBLD CKD-EPI 2021: 78.7 ML/MIN/1.73
GLOBULIN UR ELPH-MCNC: 3 GM/DL
GLUCOSE SERPL-MCNC: 99 MG/DL (ref 65–99)
HDLC SERPL-MCNC: 52 MG/DL (ref 40–60)
LDLC SERPL CALC-MCNC: 132 MG/DL (ref 0–100)
LDLC/HDLC SERPL: 2.47 {RATIO}
POTASSIUM SERPL-SCNC: 3.3 MMOL/L (ref 3.5–5.2)
PROT SERPL-MCNC: 7.7 G/DL (ref 6–8.5)
SODIUM SERPL-SCNC: 138 MMOL/L (ref 136–145)
TRIGL SERPL-MCNC: 172 MG/DL (ref 0–150)
VLDLC SERPL-MCNC: 31 MG/DL (ref 5–40)

## 2024-08-27 PROCEDURE — 36415 COLL VENOUS BLD VENIPUNCTURE: CPT

## 2024-08-27 PROCEDURE — 80061 LIPID PANEL: CPT

## 2024-08-27 PROCEDURE — 80053 COMPREHEN METABOLIC PANEL: CPT

## 2024-08-29 ENCOUNTER — OFFICE VISIT (OUTPATIENT)
Dept: CARDIOLOGY | Facility: CLINIC | Age: 54
End: 2024-08-29
Payer: COMMERCIAL

## 2024-08-29 VITALS
BODY MASS INDEX: 28.89 KG/M2 | SYSTOLIC BLOOD PRESSURE: 134 MMHG | WEIGHT: 169.2 LBS | HEART RATE: 69 BPM | HEIGHT: 64 IN | DIASTOLIC BLOOD PRESSURE: 88 MMHG

## 2024-08-29 DIAGNOSIS — E78.5 HYPERLIPIDEMIA LDL GOAL <100: ICD-10-CM

## 2024-08-29 DIAGNOSIS — I10 ESSENTIAL HYPERTENSION: Primary | ICD-10-CM

## 2024-08-29 PROCEDURE — 99214 OFFICE O/P EST MOD 30 MIN: CPT | Performed by: NURSE PRACTITIONER

## 2024-08-29 PROCEDURE — 93000 ELECTROCARDIOGRAM COMPLETE: CPT | Performed by: NURSE PRACTITIONER

## 2024-08-29 RX ORDER — POTASSIUM CHLORIDE 1500 MG/1
20 TABLET, EXTENDED RELEASE ORAL DAILY
Qty: 30 TABLET | Refills: 2 | Status: SHIPPED | OUTPATIENT
Start: 2024-08-29

## 2024-08-29 NOTE — PROGRESS NOTES
Date of Office Visit: 2024  Encounter Provider: CHASITY Rust  Place of Service: [facility] CARDIOLOGY  Patient Name: Yulissa Landa  :1970    Chief Complaint   Patient presents with    Hypertension   :     HPI: Yulissa Landa is a 53 y.o. female patient of Dr. Eden's with hypertension and hyperlipidemia.  She has a family history of hypertrophic cardiomyopathy.    She was last seen in the office by Dr. Eden in February at which time she was overall doing well.  Her cholesterol numbers had worsened, and Dr. Eden recommended increasing the rosuvastatin dose.  Otherwise, she was advised to follow-up in 6 months.    She has been doing well.  He denies any chest pain, palpitations, edema, dizziness, syncope.  She is trying to be more active.  She has mild shortness of breath with inclines which is essentially chronic and unchanged.  She is worried about her potassium and CO2 levels on her recent labs.    Past Medical History:   Diagnosis Date    HTN, goal below 140/90     Hyperlipidemia        Past Surgical History:   Procedure Laterality Date     SECTION      COLONOSCOPY N/A 2022    Procedure: COLONOSCOPY;  Surgeon: Eva Altman MD;  Location: [facility] OR;  Service: Gastroenterology;  Laterality: N/A;  normal    HYSTERECTOMY      LAPAROSCOPIC SALPINGOOPHERECTOMY Bilateral        Social History     Socioeconomic History    Marital status: Unknown   Tobacco Use    Smoking status: Never    Smokeless tobacco: Never    Tobacco comments:     caffeine use    Vaping Use    Vaping status: Never Used   Substance and Sexual Activity    Alcohol use: Yes     Comment: rare    Drug use: Never    Sexual activity: Yes       Family History   Problem Relation Age of Onset    Hypertrophic cardiomyopathy Other     Heart disease Mother     Diabetes Father     Cancer Maternal Grandmother     Sudden death Paternal Grandmother     Cancer Paternal  "Grandfather     Malig Hyperthermia Neg Hx        Review of Systems   Constitutional: Negative.   Cardiovascular:  Positive for dyspnea on exertion. Negative for chest pain, leg swelling, orthopnea, paroxysmal nocturnal dyspnea and syncope.   Respiratory: Negative.     Hematologic/Lymphatic: Negative for bleeding problem.   Musculoskeletal:  Negative for falls.   Gastrointestinal:  Negative for melena.   Neurological:  Negative for dizziness and light-headedness.       Allergies   Allergen Reactions    Pravastatin Myalgia         Current Outpatient Medications:     carvedilol (COREG) 6.25 MG tablet, Take 1 tablet by mouth 2 (Two) Times a Day With Meals., Disp: 180 tablet, Rfl: 3    ELDERBERRY PO, Take  by mouth., Disp: , Rfl:     estradiol (MINIVELLE, VIVELLE-DOT) 0.05 MG/24HR patch, Place 1 patch on the skin as directed by provider 2 (Two) Times a Week., Disp: , Rfl:     hydroCHLOROthiazide (HYDRODIURIL) 25 MG tablet, Take 1 tablet by mouth Daily., Disp: 90 tablet, Rfl: 3    Multiple Vitamin (ONE-A-DAY ESSENTIAL) tablet, Take  by mouth., Disp: , Rfl:     rosuvastatin (CRESTOR) 10 MG tablet, Take 1 tablet by mouth 3 (Three) Times a Week., Disp: 30 tablet, Rfl: 3    vitamin E 100 UNIT capsule, Take 1 capsule by mouth Daily., Disp: , Rfl:     potassium chloride (K-TAB) 20 MEQ tablet controlled-release ER tablet, Take 1 tablet by mouth Daily., Disp: 30 tablet, Rfl: 2      Objective:     Vitals:    08/29/24 1241   BP: 134/88   Pulse: 69   Weight: 76.7 kg (169 lb 3.2 oz)   Height: 162.6 cm (64\")     Body mass index is 29.04 kg/m².    PHYSICAL EXAM:    Neck:      Vascular: No JVD.   Pulmonary:      Effort: Pulmonary effort is normal.      Breath sounds: Normal breath sounds.   Cardiovascular:      Normal rate. Regular rhythm.      Murmurs: There is no murmur.      No gallop.  No click. No rub.   Pulses:     Intact distal pulses.           ECG 12 Lead    Date/Time: 8/29/2024 12:50 PM  Performed by: Vicki Martinez, " CHASITY    Authorized by: Vicki Martinez APRN  Comparison: compared with previous ECG from 2/29/2024  Similar to previous ECG  Rhythm: sinus rhythm  Rate: normal  BPM: 69            Assessment:       Diagnosis Plan   1. Essential hypertension  ECG 12 Lead    Basic Metabolic Panel      2. Hyperlipidemia LDL goal <100          Orders Placed This Encounter   Procedures    Basic Metabolic Panel     Standing Status:   Future     Standing Expiration Date:   8/29/2025     Order Specific Question:   Release to patient     Answer:   Routine Release [[id number]]    ECG 12 Lead     This order was created via procedure documentation     Order Specific Question:   Release to patient     Answer:   Routine Release [[id number]]          Plan:       1.  Hypertension.  Her blood pressure is stable.  Continue HCTZ and carvedilol.      2.  Hyperlipidemia.  Lipid panel from 2 days ago demonstrated an LDL of 132 and an HDL of 52.  Continue atorvastatin at current dose.      Overall, I think she is doing well.  In regards to the potassium, I have recommended potassium supplementation.  I think the CO2 is inconsequential.  We will repeat a BMP in 1 to 2 weeks.  Otherwise, she will follow-up with Dr. Eden in 6 months.      As always, it has been a pleasure to participate in your patient's care.      Sincerely,         CHASITY Marcelino

## 2024-10-02 ENCOUNTER — LAB (OUTPATIENT)
Dept: LAB | Facility: HOSPITAL | Age: 54
End: 2024-10-02
Payer: COMMERCIAL

## 2024-10-02 ENCOUNTER — TELEPHONE (OUTPATIENT)
Dept: CARDIOLOGY | Facility: CLINIC | Age: 54
End: 2024-10-02
Payer: COMMERCIAL

## 2024-10-02 DIAGNOSIS — I10 ESSENTIAL HYPERTENSION: ICD-10-CM

## 2024-10-02 LAB
ANION GAP SERPL CALCULATED.3IONS-SCNC: 10 MMOL/L (ref 5–15)
BUN SERPL-MCNC: 19 MG/DL (ref 6–20)
BUN/CREAT SERPL: 19.6 (ref 7–25)
CALCIUM SPEC-SCNC: 10 MG/DL (ref 8.6–10.5)
CHLORIDE SERPL-SCNC: 99 MMOL/L (ref 98–107)
CO2 SERPL-SCNC: 31 MMOL/L (ref 22–29)
CREAT SERPL-MCNC: 0.97 MG/DL (ref 0.57–1)
EGFRCR SERPLBLD CKD-EPI 2021: 69.6 ML/MIN/1.73
GLUCOSE SERPL-MCNC: 95 MG/DL (ref 65–99)
POTASSIUM SERPL-SCNC: 4.1 MMOL/L (ref 3.5–5.2)
SODIUM SERPL-SCNC: 140 MMOL/L (ref 136–145)

## 2024-10-02 PROCEDURE — 80048 BASIC METABOLIC PNL TOTAL CA: CPT

## 2024-10-02 PROCEDURE — 36415 COLL VENOUS BLD VENIPUNCTURE: CPT

## 2024-10-02 NOTE — TELEPHONE ENCOUNTER
Left voicemail for Yulissa Landa requesting callback.    HUB: please transfer to Triage if patient returns call    Thank you,  Shilpi CLARK RN  Triage Nurse [facility]  10/02/24   12:56 EDT

## 2024-10-02 NOTE — TELEPHONE ENCOUNTER
Yes. Her potassium was low which is why I recommended replacement.  It is now normal, and I would continue taking it.

## 2024-10-02 NOTE — TELEPHONE ENCOUNTER
Reviewed recommendations with Yulissa Landa and the patient verbalized understanding of the recommendations.    Thank you,  Shilpi CLARK RN  Triage Nurse [facility]  10/02/24  13:23 EDT

## 2024-10-02 NOTE — TELEPHONE ENCOUNTER
Yulissa Landa returned call.  Reviewed results and recommendations with patient.  She verbalized understanding of results but did have a follow up question about her medications.    Vicki,  Patient stated that she was only taking potassium supplement until she had lab work done and wanted to know if she needs to continue taking potassium or if she should stop it now?    Please let me know how you would like to proceed.    Thank you,  Shilpi CLARK RN  Triage Nurse [facility]  10/02/24  13:11 EDT

## 2024-10-02 NOTE — TELEPHONE ENCOUNTER
----- Message from Vicki Martinez sent at 10/2/2024 12:42 PM EDT -----  Please let her know her labs are stable.  The mild elevation in her CO2 is insignificant.  I would not recommend any changes.

## 2024-10-18 RX ORDER — ROSUVASTATIN CALCIUM 10 MG/1
10 TABLET, COATED ORAL 3 TIMES WEEKLY
Qty: 30 TABLET | Refills: 6 | Status: SHIPPED | OUTPATIENT
Start: 2024-10-18

## 2024-10-25 ENCOUNTER — APPOINTMENT (OUTPATIENT)
Dept: WOMENS IMAGING | Facility: HOSPITAL | Age: 54
End: 2024-10-25
Payer: COMMERCIAL

## 2024-10-25 PROCEDURE — 77063 BREAST TOMOSYNTHESIS BI: CPT | Performed by: RADIOLOGY

## 2024-10-25 PROCEDURE — 77067 SCR MAMMO BI INCL CAD: CPT | Performed by: RADIOLOGY

## 2024-11-12 RX ORDER — CARVEDILOL 6.25 MG/1
6.25 TABLET ORAL 2 TIMES DAILY WITH MEALS
Qty: 180 TABLET | Refills: 3 | Status: SHIPPED | OUTPATIENT
Start: 2024-11-12

## 2024-11-14 RX ORDER — ROSUVASTATIN CALCIUM 10 MG/1
TABLET, COATED ORAL
Qty: 30 TABLET | Refills: 11 | Status: SHIPPED | OUTPATIENT
Start: 2024-11-14

## 2024-11-27 RX ORDER — POTASSIUM CHLORIDE 1500 MG/1
20 TABLET, EXTENDED RELEASE ORAL DAILY
Qty: 30 TABLET | Refills: 2 | Status: SHIPPED | OUTPATIENT
Start: 2024-11-27

## 2025-03-06 ENCOUNTER — OFFICE VISIT (OUTPATIENT)
Age: 55
End: 2025-03-06
Payer: COMMERCIAL

## 2025-03-06 VITALS
DIASTOLIC BLOOD PRESSURE: 74 MMHG | BODY MASS INDEX: 29.12 KG/M2 | HEIGHT: 64 IN | HEART RATE: 73 BPM | WEIGHT: 170.6 LBS | SYSTOLIC BLOOD PRESSURE: 120 MMHG

## 2025-03-06 DIAGNOSIS — Z82.49 FAMILY HISTORY OF HYPERTROPHIC CARDIOMYOPATHY: ICD-10-CM

## 2025-03-06 DIAGNOSIS — I10 ESSENTIAL HYPERTENSION: Primary | ICD-10-CM

## 2025-03-06 DIAGNOSIS — E78.5 HYPERLIPIDEMIA LDL GOAL <100: ICD-10-CM

## 2025-03-06 PROCEDURE — 93000 ELECTROCARDIOGRAM COMPLETE: CPT | Performed by: INTERNAL MEDICINE

## 2025-03-06 PROCEDURE — 99214 OFFICE O/P EST MOD 30 MIN: CPT | Performed by: INTERNAL MEDICINE

## 2025-03-06 NOTE — PROGRESS NOTES
[facility] Follow Up Patient Office Note     Encounter Date:25  Patient:Yulissa Landa  :1970  MRN:[id number]      Chief Complaint:   Chief Complaint   Patient presents with    Hypertension    Hyperlipidemia     6 month f/u     History of Presenting Illness:      Ms. Landa is a 54 y.o. woman with past medical history notable for hypertension and family history of hypertrophic cardiomyopathy who presents to our office for scheduled follow-up.  From a cardiac standpoint she is doing well 1 thing she has noticed that she is having some difficulty swallowing pills and some discomfort at night in her back and neck.  Has no symptoms during the day.  She was actually concerned it might be her esophagus her father actually had esophageal stricture and needed it stretched.        Review of Systems:  Review of Systems   Constitutional: Negative.   HENT:  Positive for odynophagia.    Eyes: Negative.    Cardiovascular: Negative.    Respiratory: Negative.     Endocrine: Negative.    Hematologic/Lymphatic: Negative.    Skin: Negative.    Musculoskeletal: Negative.    Gastrointestinal:  Positive for dysphagia.   Genitourinary: Negative.    Neurological: Negative.    Psychiatric/Behavioral: Negative.     Allergic/Immunologic: Negative.        Current Outpatient Medications on File Prior to Visit   Medication Sig Dispense Refill    carvedilol (COREG) 6.25 MG tablet Take 1 tablet by mouth 2 (Two) Times a Day With Meals. 180 tablet 3    ELDERBERRY PO Take  by mouth.      estradiol (MINIVELLE, VIVELLE-DOT) 0.05 MG/24HR patch Place 1 patch on the skin as directed by provider 2 (Two) Times a Week.      hydroCHLOROthiazide (HYDRODIURIL) 25 MG tablet Take 1 tablet by mouth Daily. 90 tablet 3    Multiple Vitamin (ONE-A-DAY ESSENTIAL) tablet Take  by mouth.      rosuvastatin (CRESTOR) 10 MG tablet TAKE 1 TABLET BY MOUTH THREE TIMES WEEKLY 30 tablet 11    vitamin E 100 UNIT capsule Take 1 capsule by mouth Daily.       "potassium chloride ER (K-TAB) 20 MEQ tablet controlled-release ER tablet TAKE 1 TABLET BY MOUTH DAILY 30 tablet 2     No current facility-administered medications on file prior to visit.       Allergies   Allergen Reactions    Pravastatin Myalgia       Past Medical History:   Diagnosis Date    HTN, goal below 140/90     Hyperlipidemia        Past Surgical History:   Procedure Laterality Date     SECTION      COLONOSCOPY N/A 2022    Procedure: COLONOSCOPY;  Surgeon: Eva Altman MD;  [place] [facility] [place]  Service: Gastroenterology;  Laterality: N/A;  normal    HYSTERECTOMY      LAPAROSCOPIC SALPINGOOPHERECTOMY Bilateral        Social History     Socioeconomic History    Marital status: Unknown   Tobacco Use    Smoking status: Never    Smokeless tobacco: Never    Tobacco comments:     caffeine use    Vaping Use    Vaping status: Never Used   Substance and Sexual Activity    Alcohol use: Yes     Comment: rare    Drug use: Never    Sexual activity: Yes       Family History   Problem Relation Age of Onset    Hypertrophic cardiomyopathy Other     Heart disease Mother     Diabetes Father     Cancer Maternal Grandmother     Sudden death Paternal Grandmother     Cancer Paternal Grandfather     Malig Hyperthermia Neg Hx        The following portions of the patient's history were reviewed and updated as appropriate: allergies, current medications, past family history, past medical history, past social history, past surgical history and problem list.       Objective:       Vitals:    25 1229   BP: 120/74   BP Location: Left arm   Patient Position: Sitting   Pulse: 73   Weight: 77.4 kg (170 lb 9.6 oz)   Height: 162.6 cm (64\")     Body mass index is 29.28 kg/m².     Physical Exam:  Constitutional: Well appearing, Well-developed, No acute distress   HENT: Oropharynx clear and membrane moist  Eyes: Normal conjunctiva, no sclera icterus.  Neck: Supple, no carotid bruit bilaterally.  Cardiovascular: " "Regular rate and rhythm, No Murmur, No bilateral lower extremity edema.  Pulmonary: Normal respiratory effort, normal lung sounds, no wheezing.  Neurological: Alert and orient x 3.   Skin: Warm, dry, no ecchymosis, no rash.  Psych: Appropriate mood and affect. Normal judgment and insight.        Lab Results   Component Value Date    GLUCOSE 95 10/02/2024    BUN 19 10/02/2024    CREATININE 0.97 10/02/2024    EGFRIFAFRI >60 02/17/2023    BCR 19.6 10/02/2024    K 4.1 10/02/2024    CO2 31.0 (H) 10/02/2024    CALCIUM 10.0 10/02/2024    ALBUMIN 4.7 08/27/2024    LABIL2 1.6 02/17/2023    AST 26 08/27/2024    ALT 37 (H) 08/27/2024       Lab Results   Component Value Date    WBC 5.1 02/27/2024    HGB 14.1 02/27/2024    HCT 43.8 02/27/2024    MCV 89.2 02/27/2024     02/27/2024     No results found for: \"CKTOTAL\", \"CKMB\", \"CKMBINDEX\", \"TROPONINI\", \"TROPONINT\"    Lab Results   Component Value Date    CHOL 215 (H) 08/27/2024    CHOL 257 (H) 01/04/2022    CHLPL 265 (H) 08/04/2020    CHLPL 245 (H) 09/04/2019    CHLPL 241 (H) 06/06/2018     Lab Results   Component Value Date    TRIG 172 (H) 08/27/2024    TRIG 203 (H) 01/04/2022    TRIG 221 (H) 08/04/2020     Lab Results   Component Value Date    HDL 52 08/27/2024    HDL 56 01/04/2022    HDL 59 08/04/2020     Lab Results   Component Value Date     (H) 08/27/2024     (H) 01/04/2022     (H) 08/04/2020     The 10-year ASCVD risk score (Greta GUILLEN, et al., 2019) is: 2.4%    Values used to calculate the score:      Age: 54 years      Sex: Female      Is Non- : No      Diabetic: No      Tobacco smoker: No      Systolic Blood Pressure: 120 mmHg      Is BP treated: Yes      HDL Cholesterol: 52 mg/dL      Total Cholesterol: 215 mg/dL     No results found for: \"TSH\"      ECG 12 Lead    Date/Time: 3/6/2025 1:25 PM  Performed by: Osman Eden MD    Authorized by: Osman Eden MD  Comparison: compared with previous ECG from " 8/29/2024  Similar to previous ECG  Rhythm: sinus rhythm        Echocardiogram 10/20/2023:  Left ventricular systolic function is normal. Left ventricular ejection fraction appears to be 61 - 65%  Left ventricular diastolic function was normal.  Estimated right ventricular systolic pressure from tricuspid regurgitation is normal (<35 mmHg). Calculated right ventricular systolic pressure from tricuspid regurgitation is 23 mmHg.    Echocardiogram [facility] 8/19/2020:  The ejection fraction biplane was calculated at 61%. Mild concentric left ventricular hypertrophy.  Mild aortic regurgitation is noted.  Trace mitral regurgitation is present.  Mild tricuspid regurgitation.  Right ventricular systolic pressure of 16 mmHg.  Trace pulmonic insufficiency present.          Assessment:          Diagnosis Plan   1. Essential hypertension        2. Hyperlipidemia LDL goal <100        3. Family history of hypertrophic cardiomyopathy  ECG 12 Lead                 Plan:       Ms. Landa is a 54 y.o. woman with past medical history notable for hypertension and family history of hypertrophic cardiomyopathy who presents to our office for scheduled follow-up.  From a cardiac standpoint I think she is doing well her symptoms sound more GI or esophageal in nature she does have a GI doctor she is going to reach out to I do not think that this would be atypical cardiac presentation since symptoms mainly are with swallowing and occur at night and has no exertional component or even symptoms throughout the day and it seems very positional.  Obviously if workup is unrevealing would not be unreasonable to reconsider other cardiac testing but would hold off for now.  I am pleased that her cholesterol is looking better on her current regimen we will continue with her current medical regimen no changes are needed and see back in 6 months or sooner if no other etiology for her discomfort      Family history of hypertrophic  cardiomyopathy:  Patient without any high risk features on echocardiogram 10/2023  No high risk clinical features  No further testing needed at this time but could consider cardiac MRI or Holter monitor if any new symptoms develop  Continue carvedilol    Mixed hyperlipidemia:  Repeat lipid panel this week shows stable and cholesterol panel with LDL of 134 8/2024.  Rosuvastatin 10 mg Monday/Wednesday/Friday  Normal ALT and AST 8/2024    Hypertension:  Blood pressure well-controlled no changes needed at this time  CMP with normal sodium, Cr, and potassium 2/2024      Follow-up:  6 months      Thank you for allowing me to participate in the care of Yulissa Landa. Feel free to contact me directly with any further questions or concerns.    Osman Eden MD  [facility]  03/06/25  13:26 EST

## 2025-03-28 ENCOUNTER — OFFICE (OUTPATIENT)
Dept: URBAN - METROPOLITAN AREA CLINIC 76 | Facility: CLINIC | Age: 55
End: 2025-03-28

## 2025-03-28 VITALS
HEART RATE: 78 BPM | WEIGHT: 169 LBS | DIASTOLIC BLOOD PRESSURE: 73 MMHG | SYSTOLIC BLOOD PRESSURE: 116 MMHG | HEIGHT: 64 IN

## 2025-03-28 DIAGNOSIS — K21.9 GASTRO-ESOPHAGEAL REFLUX DISEASE WITHOUT ESOPHAGITIS: ICD-10-CM

## 2025-03-28 DIAGNOSIS — K64.9 UNSPECIFIED HEMORRHOIDS: ICD-10-CM

## 2025-03-28 DIAGNOSIS — R10.11 RIGHT UPPER QUADRANT PAIN: ICD-10-CM

## 2025-03-28 DIAGNOSIS — K59.00 CONSTIPATION, UNSPECIFIED: ICD-10-CM

## 2025-03-28 PROCEDURE — 99204 OFFICE O/P NEW MOD 45 MIN: CPT

## 2025-03-28 RX ORDER — PANTOPRAZOLE SODIUM 40 MG/1
40 TABLET, DELAYED RELEASE ORAL
Qty: 90 | Refills: 1 | Status: ACTIVE
Start: 2025-03-28

## 2025-05-07 ENCOUNTER — TELEPHONE (OUTPATIENT)
Age: 55
End: 2025-05-07
Payer: COMMERCIAL

## 2025-05-07 NOTE — TELEPHONE ENCOUNTER
Received a voicemail from Dr. Amarjit Rader office. Patient is needing surgical clearance. She is scheduled to have a robotic laparoscopic gallbladder removed on 05/14/25.     This will be done a [facility]. Patient does not take any blood thinners. She was last seen 03/06/25. Please advise.     Their office can be reached at [phone] or fax number [phone].

## 2025-05-07 NOTE — TELEPHONE ENCOUNTER
Patient last seen in our office 3/2025 at that time was doing great clinically from a cardiac perspective.  Has family history of hypertrophic cardiomyopathy but no symptoms and last echocardiogram in 2023 without any high risk features or evidence of severe thickening of her myocardium.  From a cardiac standpoint she may proceed forward with planned surgery no repeat cardiac testing is needed at this time unless any significant change in symptoms from when I saw her in March.    Electronically signed by Osman Eden MD, 05/07/25, 8:02 AM EDT.

## 2025-05-14 ENCOUNTER — OFFICE (OUTPATIENT)
Dept: URBAN - METROPOLITAN AREA CLINIC 76 | Facility: CLINIC | Age: 55
End: 2025-05-14

## 2025-07-07 RX ORDER — POTASSIUM CHLORIDE 1500 MG/1
20 TABLET, EXTENDED RELEASE ORAL DAILY
Qty: 30 TABLET | Refills: 2 | Status: SHIPPED | OUTPATIENT
Start: 2025-07-07

## (undated) DEVICE — GOWN ISOL W/THUMB UNIV BLU BX/15

## (undated) DEVICE — KT ORCA ORCAPOD DISP STRL

## (undated) DEVICE — CANN NASL CO2 TRULINK W/O2 A/

## (undated) DEVICE — Device

## (undated) DEVICE — FLEX ADVANTAGE 1500CC: Brand: FLEX ADVANTAGE